# Patient Record
Sex: MALE | Race: WHITE | Employment: OTHER | URBAN - METROPOLITAN AREA
[De-identification: names, ages, dates, MRNs, and addresses within clinical notes are randomized per-mention and may not be internally consistent; named-entity substitution may affect disease eponyms.]

---

## 2019-04-22 ENCOUNTER — HOSPITAL ENCOUNTER (OUTPATIENT)
Dept: PHYSICAL THERAPY | Facility: CLINIC | Age: 39
Setting detail: THERAPIES SERIES
End: 2019-04-22
Attending: INTERNAL MEDICINE
Payer: MEDICAID

## 2019-04-22 ENCOUNTER — HOSPITAL ENCOUNTER (OUTPATIENT)
Dept: OCCUPATIONAL THERAPY | Facility: CLINIC | Age: 39
Setting detail: THERAPIES SERIES
End: 2019-04-22
Attending: INTERNAL MEDICINE
Payer: MEDICAID

## 2019-04-22 PROCEDURE — 97110 THERAPEUTIC EXERCISES: CPT | Mod: GP | Performed by: PHYSICAL THERAPIST

## 2019-04-22 PROCEDURE — 97162 PT EVAL MOD COMPLEX 30 MIN: CPT | Mod: GP | Performed by: PHYSICAL THERAPIST

## 2019-04-22 PROCEDURE — 97167 OT EVAL HIGH COMPLEX 60 MIN: CPT | Mod: GO | Performed by: REHABILITATION PRACTITIONER

## 2019-04-22 PROCEDURE — 97110 THERAPEUTIC EXERCISES: CPT | Mod: GO | Performed by: REHABILITATION PRACTITIONER

## 2019-04-22 ASSESSMENT — 6 MINUTE WALK TEST (6MWT)
COMMENTS: NO AD
TOTAL DISTANCE WALKED (FT): 1252

## 2019-04-22 NOTE — PROGRESS NOTES
[facility]        OUTPATIENT PHYSICAL THERAPY FUNCTIONAL EVALUATION  PLAN OF TREATMENT FOR OUTPATIENT REHABILITATION  (COMPLETE FOR INITIAL CLAIMS ONLY)  Patient's Last Name, First Name, M.I.  YOB: 1980  Srinivasa Casas        Provider's Name   [facility]   Medical Record No.  [id number]     Start of Care Date:  04/22/19   Onset Date:  03/13/19   Type:     _X__PT   ____OT  ____SLP Medical Diagnosis:  Hemiplegia and hemiparesis following cerebral infarction affecting right dominant side I69.351     PT Diagnosis:  Impaired functional activity tolerance secondary to right hemiparesis Visits from SOC:  1                              __________________________________________________________________________________  Plan of Treatment/Functional Goals:  balance training, gait training, neuromuscular re-education, ROM, strengthening, stretching, transfer training           GOALS  HEP  Patient will demonstrate understanding and compliance to his HEP for continued wellbeing upon discharge from skilled physical therapy.  06/03/19    6 MWT  Patient will ambulate 1480 feet during the 6 MWT with least restrictive AD to demonstrate improved activity tolerance for independent and safe community ambulation.  06/03/19    Balance  Patient will maintain single limb stance for 30 seconds bilaterally to demonstrate improved balance with decreased ANTONELLA for return to work without limitation.  06/03/19                                                           Therapy Frequency:  2 times/Week(Decreasing in frequency as indicated)   Predicted Duration of Therapy Intervention:  6 weeks    Rachael Mason DPT                                    I CERTIFY THE NEED FOR THESE SERVICES FURNISHED UNDER        THIS PLAN OF TREATMENT AND WHILE UNDER MY CARE     (Physician co-signature of this document  indicates review and certification of the therapy plan).                Certification Date From:  04/22/19   Certification Date To:  06/03/19    Referring Provider:  Lewis Farfan MD    Initial Assessment  See Epic Evaluation- Start of Care Date: 04/22/19

## 2019-04-22 NOTE — PROGRESS NOTES
04/22/19 0900   Quick Adds   Quick Adds Certification   Type of Visit Initial OP PT Evaluation   General Information   Start of Care Date 04/22/19   Referring Physician Lewis Farfan MD   Orders Evaluate and Treat as Indicated   Additional Orders Duration per therapist discretion   Order Date 04/11/19   Medical Diagnosis Hemiplegia and hemiparesis following cerebral infarction affecting right dominant side I69.351   Onset of illness/injury or Date of Surgery 03/13/19   Precautions/Limitations fall precautions   Surgical/Medical history reviewed Yes   Pertinent history of current problem (include personal factors and/or comorbidities that impact the POC) Per [facility] note: Patient was on a business trip in [place], [place], when he called his wife on 3/12/19 complaining of chest discomfort that improved with Zantac.  The following day around 2 PM, he was at a food court with his uncle when he suddenly collapsed.  He was noted to have right facial droop, aphasia, and right-sided weakness.  He was taken to the Emergency Department at [facility] and was found to have a STEMI and left MCA stroke.    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 He currently has right hemiparesis, global aphasia, and dysphagia. Patient's wife reports increased stiffness in the ankle, initially some drop foot but more stiffness. Patient's wife reports he is sitting more at home - possibly due to wound on the leg. Patient has been working on ankle pumps but resistant with his wife - reports he gets angry. Patient arguementative with his wife during session. Patient has not had any edema in the right foot, but it is stiff   Pertinent Visual History  Denies recent changes in vision   Prior level of function comment Patient previously independent with  all daily activities, functional mobility, and speech   Current Community Support Family/friend caregiver   Patient role/Employment history Employed  (Self employed/asphalt)   Living environment House/[facility]   Home/Community Accessibility Comments Lives with his wife in a single story house, 3 JAS with a railing   Current Assistive Devices   (None)   ADL Devices   (None)   Patient/Family Goals Statement Improve right sided strength   General Information Comments Patient has global aphasia   Fall Risk Screen   Fall screen completed by PT   Have you fallen 2 or more times in the past year? No   Have you fallen and had an injury in the past year? No   Timed Up and Go score (seconds) NTdoreen   Is patient a fall risk? No   Fall screen comments No history of falls, no increased fall risk per the Warren   Pain   Patient currently in pain Yes   Pain location Right lower leg and ankle   Pain description   (Stitches)   Vitals Signs   Heart Rate 78   SpO2 98   Blood Pressure 104/72   Vital Signs Comments Right arm, seated, resting   Cognitive Status Examination   Orientation other (see comments)  (Global aphasia, appears oriented but easily agitate)   Level of Consciousness alert;agitated   Follows Commands and Answers Questions able to follow single-step instructions;75% of the time   Personal Safety and Judgment impaired   Memory impaired  (Wife assists with history )   Integumentary   Integumentary Other   Integumentary Comments Currently has stitches in his leg from surgical procedure - incisions vertically along medial and lateral lower leg, getting them out at [facility] on 5/13/19   Posture   Posture Forward head position   Range of Motion (ROM)   ROM Quick Adds no deficits were identified   Strength   Strength Comments R LE grossly 4-/5   Bed Mobility   Bed Mobility Comments Independent   Transfer Skills   Transfer Comments Independent without UE assist   Gait   Gait Comments Patient ambulates with wide ANTONELLA,  decreased left step length with increased limping on the right leg due to incisional pain, decreased right stance time and increased hip abduction with onset of pain at 4.5 minutes of 6 MWT, decreased right arm swing   Gait Special Tests   Gait Special Tests SIX MINUTE WALK TEST   Gait Special Tests Six Minute Walk Test   Feet 1252 Feet   Comments No AD   Balance Special Tests   Balance Special Tests Single leg stance right;Single leg stance left;Sit to stand reps;Warren balance   Balance Special Tests Warren Balance   Score out of 56 56   Comments <46 indicates increased risk for falls   Balance Special Tests Single Leg Stance Right,   Right, seconds 24.1 Seconds   Comments Significant postural sway   Balance Special Tests Single Leg Stance Left   Left, seconds 12.94 Seconds   Balance Special Tests Sit to Stand Reps in 30 Seconds   Reps in 30 seconds 15   Height 18 inches   Sensory Examination   Sensory Perception no deficits were identified   Sensory Perception Comments Intact to light touch   Coordination   Coordination other (see comments)   Coordination Comments Impaired MANNY UE and LE   Muscle Tone   Muscle Tone no deficits were identified   Modality Interventions   Planned Modality Interventions Comments Per therapist discretion   Planned Therapy Interventions   Planned Therapy Interventions balance training;gait training;neuromuscular re-education;ROM;strengthening;stretching;transfer training   Clinical Impression   Criteria for Skilled Therapeutic Interventions Met yes, treatment indicated   PT Diagnosis Impaired functional activity tolerance secondary to right hemiparesis   Influenced by the following impairments Impaired coordination, strength, endurance, pain, surgical incisions   Functional limitations due to impairments Limited household or community ambulation, unable to work, increased pain and fatigue with prolonged walking   Clinical Presentation Stable/Uncomplicated   Clinical Presentation Rationale  Medically stable   Clinical Decision Making (Complexity) Moderate complexity   Therapy Frequency 2 times/Week  (Decreasing in frequency as indicated)   Predicted Duration of Therapy Intervention (days/wks) 6 weeks   Risk & Benefits of therapy have been explained Yes   Patient, Family & other staff in agreement with plan of care Yes   Clinical Impression Comments Patient is a 39 year old male presenting to physical therapy with right hemiparesis following a CVA. Patient presents with impaired coordination, endurance, and pain with prolonged activity. Patient has good rehab potential and may benefit from skilled physical therapy to address these deficits and facilitate return to work without limitation.   Education Assessment   Preferred Learning Style Demonstration   Barriers to Learning Language  (Global aphasia)   GOALS   PT Eval Goals 1;2;3   Goal 1   Goal Identifier HEP   Goal Description Patient will demonstrate understanding and compliance to his HEP for continued wellbeing upon discharge from skilled physical therapy.   Target Date 06/03/19   Goal 2   Goal Identifier 6 MWT   Goal Description Patient will ambulate 1480 feet during the 6 MWT with least restrictive AD to demonstrate improved activity tolerance for independent and safe community ambulation.   Target Date 06/03/19   Goal 3   Goal Identifier Balance   Goal Description Patient will maintain single limb stance for 30 seconds bilaterally to demonstrate improved balance with decreased ANTONELLA for return to work without limitation.   Target Date 06/03/19   Total Evaluation Time   PT Eval, Moderate Complexity Minutes (14172) 45   Therapy Certification   Certification date from 04/22/19   Certification date to 06/03/19   Medical Diagnosis Hemiplegia and hemiparesis following cerebral infarction affecting right dominant side I69.351   Certification I certify the need for these services furnished under this plan of treatment and while under my care.  (Physician  co-signature of this document indicates review and certification of the therapy plan).

## 2019-04-22 NOTE — PROGRESS NOTES
04/22/19 0900   Signing Clinician's Name / Credentials   Signing clinician's name / credentials Rachael Mason, DPT   Warren Balance Scale (MERNA FERGUSON, LOVE S, AUDRA COSBY, SRIKANTH PHAM: MEASURING BALANCE IN THE ELDERLY: VALIDATION OF AN INSTRUMENT. CAN. J. PUB. HEALTH, JULY/AUGUST SUPPLEMENT 2:S7-11, 1992.)   Sit To Stand 4   Standing Unsupported 4   Sitting Unsupported 4   Stand to Sit 4   Transfers 4   Standing with Eyes Closed 4   Standing Unsupported, Feet Together 4   Reach Forward With Outstretched Arm 4   Retrieve Object From Floor 4   Turning to Look Behind 4   Turn 360 Degrees 4   Placing Alternate Foot on Stool (4-6 inches) 4   Unsupported Tandem Stand (Demonstrate to Subject) 4   One Leg Stand 4   Total Score (A score of 45 or less has been correlated with an increased risk of falls)   Total Score (out of 56) 56   Warren Balance Scale (BBS) Cutoff Scores: A score of ? 45/56 indicates an increased risk for falls.   Patient Score: 56/56    The BBS is a measure of static and dynamic standing balance that has been validated in community dwelling elderly individuals and individuals who have Parkinson's Disease, MS, and those who are s/p CVA and TBI. The test is administered without an assistive device. Scores from the Warren are used to determine the probability of falling based on the patient's previous history of falls and their test performance.     Minimal Detectable Change = 6.5   & Minimal Detectable Change (Parkinson's Disease) = 5 according to Remberto & Garciaey 2008    Assessment (rationale for performing, application to patient s function & care plan): Assess risk for falls  Minutes billed as physical performance test: 0

## 2019-04-22 NOTE — PROGRESS NOTES
04/22/19 0800   Quick Adds   Quick Adds Certification   Type of Visit Initial Outpatient Occupational Therapy Evaluation   General Information   Start Of Care Date 04/22/19   Referring Physician Lewis Farfan MD   Orders Evaluate and treat as indicated   Other Orders PT, SLP   Orders Date 04/11/19   Medical Diagnosis Hemiplegia and hemiparesis following cerebral infarction affecting right dominant side I69.351   Onset of Illness/Injury or Date of Surgery 03/13/19   Precautions/Limitations Fall precautions   Special Instructions Duration per clinician discretion   Surgical/Medical History Reviewed Yes   Additional Occupational Profile Info/Pertinent History of Current Problem March 12 heart attack, march 13 stroke, at [facility], at [place] 4/5-4/17.  Pt is living at home with wife and children and receiving out-patient services.    Role/Living Environment   Current Community Support Family/friend caregiver   Patient role/Employment history Unemployed  ([employer] previously)   Community/Avocational Activities 6 children.  Job requires measuring, drawing and math tasks.  Pt is currently not working.  Wife does not work.   Current Living Environment House  ([place])   Number of Stairs to Enter Home 3   Number of Stairs Within Home 0   Prior Level - Transfers Independent   Prior Level - Ambulation Independent   Prior Level - ADLS Independent   Prior Responsibilities - IADL Shopping;Yardwork;Driving;Work   Role/Living Environment Comments No driving until cleared by neurology and OT   Patient/family Goals Statement Driving, math skills, improve strength, improve reflexes, return to work   Pain   Patient currently in pain No   Fall Risk Screen   Fall screen completed by OT   Have you fallen 2 or more times in the past year? No   Have you fallen and had an injury in the past year? No   Is patient a fall risk? No   Cognitive Status Examination   Level of Consciousness Alert   Follows Commands and Answers  Questions Able to follow single-step instructions  (Requires visual demonstration)   Personal Safety and Judgment Impaired;At risk behaviors demonstrated   Executive Function Impulsive;Self awareness/monitoring impaired   Cognitive Comment Pt demonstrates global aphasia.  Pt unable to consistently answer yes/no questions. Cognition not assessed d/t aphasia.   Visual Perception   Visual Perception No deficits were identified   Visual Perception Comments Pt unable to indentify deficits, will further assess   Sensation   Sensation Comments Pt denies any sensation changes.   Range of Motion (ROM)   ROM Comments Shoulder flexion/abduction, elbow flexion/extension, forearm supination/pronation, wrist extension/flexion all WFL's. Requires min verbal cues to achieve max ROM for R shoulder flexion and R forearm supination. Observed pronator drift.    Strength   Strength Comments L shoulder flexion/abduction, elbow flexion/extension, forearm supination/pronation all WNLs. R shoulder flexion/abduction, elbow flexion 4-/5. R elbow extension, forearm supination/pronation 5/5.   Hand Strength   Hand Dominance Right   Left Hand  (pounds) 115 pounds   Right Hand  (pounds) 95.6 pounds   Left Lateral Pinch (pounds) 25 pounds   Right Lateral Pinch (pounds) 22 pounds   Left Three Point Pinch (pounds) 21 pounds   Right Three Point Pinch (pounds) 18 pounds   Hand Strength Comments /pinch strength 1-2SD below the mean. Pt reports this weakness would affect his ability to complete work tasks.   Coordination   Coordination Comments Pt able to complete all [facility] tasks (buttoning, zippering, writing) with no difficulty. Coordination not further assessed.   Functional Mobility   Ambulation Pt walks with no devices for assistance, slight dragging of his R foot.   Bathing   Bathing Comments Pt independent in all ADLs, per wife.    Eating/Self-Feeding   Eating/Self Feeding Comments Pt's wife reports he can cut his food but she does it  to save time.   Activity Tolerance   Activity Tolerance Pt independent in all ADLs, per wife.  Anticipate pt is in need of assistance with all ADL tasks as a result of aphasia, impulsivity and inattention.   Instrumental Activities of Daily Living Assessment   IADL Assessment/Observations Pt's wife reports he is struggling with math tasks which are vital to his work. Pt currently not working, says he needs his strength and math skills closer to baseline to return to work. Pt's wife manages all finances.  Pt is getting assist for all driving and IADL tasks.  Wife reports difficulty with follow through with Home program.    Planned Therapy Interventions   Planned Therapy Interventions ADL training;IADL training;Cognitive skills;Community/work reintegration;Neuromuscular re-education;ROM;Self care/Home management;Strengthening;Stretching;Therapeutic activities   Adult OT Eval Goals   OT Eval Goals (Adult) 1;2;3;4;5;6    OT Goal 1   Goal Identifier 1-Strength   Goal Description Pt will demonstrate independence with UE strength HEP in order to return to baseline function for work tasks.   Target Date 07/15/19    OT Goal 2   Goal Identifier 2-community reintegration   Goal Description Pt will demonstrate mod I with visual scanning, attention, and problem-solving for increased safety with community reintegration.   Target Date 07/15/19    OT Goal 3   Goal Identifier 3-Medication management   Goal Description Pt will demonstrate ability to accurately set up 4 new moderately complex medications for increased safety and independence with medication management.   Target Date 07/15/19   OT Goal 4   Goal Identifier 4-Managing routine   Goal Description Pt will demonstrate mod I with following a routine (planning his day, getting ready, etc) 50% of the time for increased independence with ADL's/IADL's   Target Date 07/15/19   OT Goal 5   Goal Identifier 5-Work   Goal Description Pt will perform simulated work activity with 90%  accuracy and min cues for return to work tasks.   Target Date 07/15/19    OT Goal 6   Goal Identifier 6 Meal prep   Goal Description Pt will complete simple meal prep task safely with mod I.    Target Date 07/15/19   Clinical Impression   Criteria for Skilled Therapeutic Interventions Met Yes, treatment indicated   OT Diagnosis Decreased IADL/community mobility/work performance   Influenced by the following impairments Decreased strength, impulsivity, reduced attention, aphasia   Assessment of Occupational Performance 1-3 Performance Deficits   Identified Performance Deficits Decreased IADL performance, impaired community mobility, work   Clinical Decision Making (Complexity) High complexity   Therapy Frequency 3x/week for 8 weeks then 2x/week for 4 weeks   Predicted Duration of Therapy Intervention (days/wks) 12 weeks   Risks and Benefits of Treatment have been explained. Yes   Patient, Family & other staff in agreement with plan of care Yes   Clinical Impression Comments Pt will benefit from skilled OT to address strength, attention, and problem solving for IADL/community mobility/work tasks.  Assessment modified as a result of aphasia and impulsivity.    Education Assessment   Barriers To Learning Language   Preferred Learning Style Demonstration;Pictures/video   Therapy Certification   Certification date from 04/22/19   Certification date to 07/21/19   Total Evaluation Time   OT Eval, Moderate Complexity Minutes (51171) 32

## 2019-04-23 ENCOUNTER — HOSPITAL ENCOUNTER (OUTPATIENT)
Dept: SPEECH THERAPY | Facility: CLINIC | Age: 39
Setting detail: THERAPIES SERIES
End: 2019-04-23
Attending: INTERNAL MEDICINE
Payer: MEDICAID

## 2019-04-23 PROCEDURE — 92523 SPEECH SOUND LANG COMPREHEN: CPT | Mod: GN | Performed by: SPEECH-LANGUAGE PATHOLOGIST

## 2019-04-23 PROCEDURE — 92507 TX SP LANG VOICE COMM INDIV: CPT | Mod: GN | Performed by: SPEECH-LANGUAGE PATHOLOGIST

## 2019-04-23 NOTE — PROGRESS NOTES
Speech-Language Pathology Department   EVALUATION  [facility]    04/23/19 1000       Present No   General Information   Type of Evaluation Speech and Language;Dysarthria   Type Of Visit Initial   Start Of Care Date 04/23/19   Referring Physician Dr. Naheed MD    Orders Evaluate And Treat   Orders Comment Global aphasia, dysphagia, cognitive decline.    Medical Diagnosis CVA   Onset Of Illness/injury Or Date Of Surgery 03/13/19   Precautions/Limitations  fall precautions   Hearing WFL   Surgical/Medical history reviewed Yes   Pertinent History Of Current Problem Pt is a 39 y.o. male, who while on a business trip in [facility], called his wife on 3/12/19 and reported chest discomfort which was improved with Zantac. The following day around 2 P.M. Pt was at a food court with his uncle and suddenly collapsed. He was noted to have R facial droop, aphasia, and R sided weakness. Pt taken to ED at [facility] and was found to have STEMI and L MCA stroke. Pt suffered an acute stroke on 3/13/19 secondary to proximal L MCA occlusion with a complicated medical course, with R iliac arterial trombus s/p surgical thrombectomy and fasciotomy. He presents today with R hemiparesis, global aphasia, and resolved dysphagia.    Prior Level Of Function Comment Pt independent in all ADLs, working full time and managing business.    Current Community Support  Family/friend caregiver  (Wife)   Patient Role/employment History Employed  (Not currently working. )   Living environment [place]   General Observations Pt alert and engaged, impulsive, delayed processing, global aphasia.    Patient/family Goals Aid Pt in speaking clearly, return to work.    Fall Risk Screen   Fall screen comments Pt in open POC w/ PT, will defer.    Oral Motor Sensory Function   Comments Mild R facial droop, wife reports some food in R corner of mouth during meals. No  concerns with drooling/saliva management.    Speech   Apraxia Battery:  Sounds (out of 10 possible) 0   Apraxia Battery:  Word Repetition - single syllable (out of 10 possible) 0   Speech Comments No apraxic like movements identified, errors d/t lack of comprehension and expressive aphasia.    Language: Auditory Comprehension (understanding of spoken language)   Tests were administered at the following levels Basic (rote activities)   Word Discrimination; [place] Diagnostic Aphasia Exam (out of 72 total) 0   One Step Commands (out of 10 total) 0  (Able to follow direct model. )   Functional Assessment Scale (Auditory Comprehension) Severe Impairment   Language: Verbal Expression (use of spoken language to express information)   Tests were administered at the following levels Basic (rote activities);Moderate (routine daily activities);Complex (vocation/community/social activities)   Automatized Sequences; [place] Diagnostic Aphasia Exam (out of 8 total) 0   Phrase/Sentence Completion (out of 10 total) 0   [place] Naming Test, short form (out of 15 total) 1  (1/3 completed d/t Pt frustration. )   Functional Assessment Scale (Verbal Expression) Severe Impairment   Reading Comprehension (understanding of written language)   Tests were administered at the following levels Basic (rote activities);Moderate (routine daily activities)   Match Letters/Match Words (out of 8 total) 8   Simple Phrase/Sentence (out of 15 total) 5   Sentences and Paragraphs; [place] Diagnostic Aphasia Exam (out of 10 total) 2   Functional Assessment Scale (Reading Comprehension) Severe Impairment   Written Expression (use of writing to express information)   Tests were administered at the following levels Basic (rote activities)   Mechanics of Writing; [place] Diagnostic Aphasia Exam (out of 5 total) 3   Recall Written Symbols; [place] Diagnostic Aphasia Exam (out of 62 total) 23  (Best acuracy for numbers 1-21. Written cue for alphabet /a/.)    Functional Assessment Scale (Written Expression) Severe Impairment   Cognitive Status Examination   Behavioral Observations confused;impulsive;delayed processing   Executive Function Deficits Noted impulsivity;inhibition;insight/awareness;self-correction;self-monitoring   Cognitive Status Exam Comments Standardized testing not appropriate at this time d/t global aphasia. Impulsivity, and limited self monitoring/awarness/correction may be a barrier to progress.    Education Assessment   Barriers to Learning Cognitive;Emotional;Language   Preferred Learning Style Listening;Reading;Demonstration;Pictures/video   General Therapy Interventions   Planned Therapy Interventions Language   Language Auditory comprehension;Reading comprehension;Verbal expression;Written expression   Clinical Impression, SLP Eval   Criteria for Skilled Therapeutic Interventions Met (SLP Eval) skilled criteria for speech language intervention met   SLP Diagnosis Severe global aphasia   (Expressive/Receptive )   Influenced by the following factors/impairments Insight/awarness, fatigue, frustration, impulsivity.    Functional limitations due to impairments Pt is unable to communicate basic wants and needs. Pt is unable to follow basic directions for safety.    Rehab potential affected by Therapy attendance, Pt motivation, carry over with recommended home exercise program (HEP).    Therapy Frequency 5 times;per week   Predicted Duration of Therapy Intervention (days/wks) 4-6 months.    Risks and Benefits of Treatment have been explained. Yes   Patient, Family & other staff in agreement with plan of care Yes   Clinical Impression Comments Pt demonstrates severe global aphasia in all areas of language including; verbal expression, written expression, reading comprehension, and auditory comprehension. Symptoms include sever word finding difficulty and severe word/letter aphasias/paraphasias/nonsensical/words/jargon  with limited awareness of errors,  "difficulty word finding and spelling in written tasks, severely impaired comprehension of basic directions, and severely impaired understanding of basic to moderate written information. Language symptoms are impairing Pt's ability to communicate basic wants and needs, and pose a significant threat to his safety d/t impaired comprehension. RECOMMEND: a course of skilled speech therapy targeting all areas of expressive and receptive language to improve language skills for daily functional communication and RTW.    Language/Cognition Goals   Language/Cognition Goals 1;2;3;4   Language/Cognition Goal 1   Goal Identifier STG 9-Wznlstna-Lwbnuz Expression (V/E)   Goal Description Pt will complete a basic word finding task (picture naming, word assoications, ect) provided max cues and 80% accuracy in order to request desired items verbally.    Target Date 07/22/19   Language/Cognition Goal 2   Goal Identifier STG 3-Gytcclnr-Rrxscga Expression (W/E)   Goal Description Pt will complete a basic written expression task (name, address, ROCIO, JESSICA) provided max cues and 80% accuracy in order to sign his name independently.    Target Date 07/22/19   Language/Cognition Goal 3   Goal Identifier STG 3-Language-Auditory Comprehension (A/C)   Goal Description Pt will complete a basic auditory comprehension task (i.e. 1 step directions) provided max cues and 80% accuracy in order to understand directions for task completion.    Target Date 07/22/19   Language/Cognition Goal 4   Goal Identifier STG 1-Xpoophwy-Zaquohu Comprehension (R/C)   Goal Description Pt will complete a basic level R/C task (i.e. read a direction) provided mod cues and 80% accuracy in order to follow written directions/steps to a task independently.    Target Date 07/22/19   Total Session Time   Sound production (artic, phonology, apraxia, dysarthria) Minutes (08298) 45   Total Evaluation Time 45  (+10 min tx. )     Thank you for referring Srinivasa \"Geo\" Yessenia to " outpatient therapy at [facility] in [place].  Please call Felicitas Coffey MA, SLP-CCC at [phone] or email [email or web address] with any questions or concerns.      Felicitas Coffey M.A., SLP-CCC  Speech-Language Pathologist

## 2019-04-23 NOTE — PROGRESS NOTES
[facility]          OUTPATIENT SPEECH LANGUAGE PATHOLOGY LANGUAGE-COGNITION  EVALUATION  PLAN OF TREATMENT FOR OUTPATIENT REHABILITATION  (COMPLETE FOR INITIAL CLAIMS ONLY)  Patient's Last Name, First Name, M.I.  YOB: 1980  Srinivasa Casas                           Provider s Name: [facility] Medical Record No.  [id number]     Onset Date:  03/13/19   Start of Care Date: 04/23/19   Type:     ___PT  __OT   _X_SLP    Medical Diagnosis:   CVA    Speech Language Pathology Diagnosis:   Severe Global Aphasia     Visits from SOC: 1                                        ________________________________________________________________________________  Plan of Treatment/Functional Goals:   Planned Therapy Interventions: Language          Language / Cognition Goals  1. Goal Identifier: STG 7-Vjljixjb-Ciojur Expression (V/E)       Goal Description: Pt will complete a basic word finding task (picture naming, word assoications, ect) provided max cues and 80% accuracy in order to request desired items verbally.        Target Date: 07/22/19   2. Goal Identifier: STG 0-Kvsomdwj-Gseealh Expression (W/E)       Goal Description: Pt will complete a basic written expression task (name, address, ROCIO, JESSICA) provided max cues and 80% accuracy in order to sign his name independently.        Target Date: 07/22/19   3. Goal Identifier: STG 3-Language-Auditory Comprehension (A/C)       Goal Description: Pt will complete a basic auditory comprehension task (i.e. 1 step directions) provided max cues and 80% accuracy in order to understand directions for task completion.        Target Date: 07/22/19   4. Goal Identifier: STG 1-Akxajkgo-Dnvotpc Comprehension (R/C)       Goal Description: Pt will complete a basic level R/C task (i.e. read a direction) provided mod cues and 80% accuracy in order to follow written  directions/steps to a task independently.        Target Date: 07/22/19           Predicted Duration of Therapy Intervention (days/wks): 5 days/week for 4-6 months.     Felicitas Coffey MA CCC-SLP       I CERTIFY THE NEED FOR THESE SERVICES FURNISHED UNDER        THIS PLAN OF TREATMENT AND WHILE UNDER MY CARE .             Physician Signature               Date    X_____________________________________________________                        Certification Date From:   4/23/2019  Certification Date To:    7/21/2019        Referring Physician:  Dr. Naheed MD     Initial Assessment        See Epic Evaluation Start Of Care Date: 04/23/19

## 2019-04-24 ENCOUNTER — HOSPITAL ENCOUNTER (OUTPATIENT)
Dept: SPEECH THERAPY | Facility: CLINIC | Age: 39
Setting detail: THERAPIES SERIES
End: 2019-04-24
Attending: INTERNAL MEDICINE
Payer: MEDICAID

## 2019-04-24 PROCEDURE — 92507 TX SP LANG VOICE COMM INDIV: CPT | Mod: GN | Performed by: SPEECH-LANGUAGE PATHOLOGIST

## 2019-04-25 ENCOUNTER — HOSPITAL ENCOUNTER (OUTPATIENT)
Dept: SPEECH THERAPY | Facility: CLINIC | Age: 39
Setting detail: THERAPIES SERIES
End: 2019-04-25
Attending: INTERNAL MEDICINE
Payer: MEDICAID

## 2019-04-25 ENCOUNTER — HOSPITAL ENCOUNTER (OUTPATIENT)
Dept: OCCUPATIONAL THERAPY | Facility: CLINIC | Age: 39
Setting detail: THERAPIES SERIES
End: 2019-04-25
Attending: INTERNAL MEDICINE
Payer: MEDICAID

## 2019-04-25 PROCEDURE — 97110 THERAPEUTIC EXERCISES: CPT | Mod: GO,XU | Performed by: REHABILITATION PRACTITIONER

## 2019-04-25 PROCEDURE — 97530 THERAPEUTIC ACTIVITIES: CPT | Mod: GO,XU | Performed by: REHABILITATION PRACTITIONER

## 2019-04-25 PROCEDURE — 92507 TX SP LANG VOICE COMM INDIV: CPT | Mod: GN | Performed by: SPEECH-LANGUAGE PATHOLOGIST

## 2019-04-25 NOTE — PROGRESS NOTES
[facility]          OUTPATIENT OCCUPATIONAL THERAPY  EVALUATION  PLAN OF TREATMENT FOR OUTPATIENT REHABILITATION  (COMPLETE FOR INITIAL CLAIMS ONLY)  Patient's Last Name, First Name, M.I.  YOB: 1980  Srinivasa Casas                           Provider's Name  [facility] Medical Record No.  [id number]                               Onset Date:   3/13/19      Start of Care Date:      4/22/19   Type:     ___PT   _X_OT   ___SLP Medical Diagnosis:   CVI with hemiplegia and hemiparesis                             OT Diagnosis:   Impaired ADL/IADL     Visits from SOC:  1   _________________________________________________________________________________  Plan of Treatment/Functional Goals:         Goals  Goal Identifier: 1-Strength  Goal Description: Pt will demonstrate independence with UE strength HEP in order to return to baseline function for work tasks.  Target Date: 07/15/19     Goal Identifier: 2-community reintegration  Goal Description: Pt will demonstrate mod I with visual scanning, attention, and problem-solving for increased safety with community reintegration.  Target Date: 07/15/19     Goal Identifier: 3-Medication management  Goal Description: Pt will demonstrate ability to accurately set up 4 new moderately complex medications for increased safety and independence with medication management.  Target Date: 07/15/19     Goal Identifier: 4-Managing routine  Goal Description: Pt will demonstrate mod I with following a routine (planning his day, getting ready, etc) 50% of the time for increased independence with ADL's/IADL's  Target Date: 07/15/19     Goal Identifier: 5-Work  Goal Description: Pt will perform simulated work activity with 90% accuracy and min cues for return to work tasks.  Target Date: 07/15/19     Goal Identifier: 6 Meal prep  Goal Description: Pt will  complete simple meal prep task safely with mod I.   Target Date: 07/15/19            Jeannette Jones OT          I CERTIFY THE NEED FOR THESE SERVICES FURNISHED UNDER        THIS PLAN OF TREATMENT AND WHILE UNDER MY CARE .             Physician Signature               Date    X_____________________________________________________      Dr. Farfan Carson [facility]     Initial Assessment        See Epic Evaluation

## 2019-04-26 ENCOUNTER — HOSPITAL ENCOUNTER (OUTPATIENT)
Dept: SPEECH THERAPY | Facility: CLINIC | Age: 39
Setting detail: THERAPIES SERIES
End: 2019-04-26
Attending: INTERNAL MEDICINE
Payer: MEDICAID

## 2019-04-26 PROCEDURE — 92507 TX SP LANG VOICE COMM INDIV: CPT | Mod: GN | Performed by: SPEECH-LANGUAGE PATHOLOGIST

## 2019-04-29 ENCOUNTER — HOSPITAL ENCOUNTER (OUTPATIENT)
Dept: PHYSICAL THERAPY | Facility: CLINIC | Age: 39
Setting detail: THERAPIES SERIES
End: 2019-04-29
Attending: INTERNAL MEDICINE
Payer: MEDICAID

## 2019-04-29 PROCEDURE — 97110 THERAPEUTIC EXERCISES: CPT | Mod: GP | Performed by: PHYSICAL THERAPIST

## 2019-04-29 PROCEDURE — 97112 NEUROMUSCULAR REEDUCATION: CPT | Mod: GP | Performed by: PHYSICAL THERAPIST

## 2019-04-30 ENCOUNTER — HOSPITAL ENCOUNTER (OUTPATIENT)
Dept: OCCUPATIONAL THERAPY | Facility: CLINIC | Age: 39
Setting detail: THERAPIES SERIES
End: 2019-04-30
Attending: INTERNAL MEDICINE
Payer: MEDICAID

## 2019-04-30 ENCOUNTER — HOSPITAL ENCOUNTER (OUTPATIENT)
Dept: SPEECH THERAPY | Facility: CLINIC | Age: 39
Setting detail: THERAPIES SERIES
End: 2019-04-30
Attending: INTERNAL MEDICINE
Payer: MEDICAID

## 2019-04-30 PROCEDURE — 92507 TX SP LANG VOICE COMM INDIV: CPT | Mod: GN | Performed by: SPEECH-LANGUAGE PATHOLOGIST

## 2019-04-30 PROCEDURE — 97537 COMMUNITY/WORK REINTEGRATION: CPT | Mod: GO | Performed by: OCCUPATIONAL THERAPIST

## 2019-05-01 ENCOUNTER — HOSPITAL ENCOUNTER (OUTPATIENT)
Dept: SPEECH THERAPY | Facility: CLINIC | Age: 39
Setting detail: THERAPIES SERIES
End: 2019-05-01
Attending: INTERNAL MEDICINE
Payer: COMMERCIAL

## 2019-05-01 ENCOUNTER — HOSPITAL ENCOUNTER (OUTPATIENT)
Dept: OCCUPATIONAL THERAPY | Facility: CLINIC | Age: 39
Setting detail: THERAPIES SERIES
End: 2019-05-01
Attending: INTERNAL MEDICINE
Payer: COMMERCIAL

## 2019-05-01 ENCOUNTER — HOSPITAL ENCOUNTER (OUTPATIENT)
Dept: PHYSICAL THERAPY | Facility: CLINIC | Age: 39
Setting detail: THERAPIES SERIES
End: 2019-05-01
Attending: INTERNAL MEDICINE
Payer: COMMERCIAL

## 2019-05-01 PROCEDURE — 92507 TX SP LANG VOICE COMM INDIV: CPT | Mod: GN | Performed by: SPEECH-LANGUAGE PATHOLOGIST

## 2019-05-01 PROCEDURE — 97110 THERAPEUTIC EXERCISES: CPT | Mod: GP | Performed by: PHYSICAL THERAPIST

## 2019-05-01 PROCEDURE — 97535 SELF CARE MNGMENT TRAINING: CPT | Mod: GO | Performed by: OCCUPATIONAL THERAPIST

## 2019-05-01 PROCEDURE — 97537 COMMUNITY/WORK REINTEGRATION: CPT | Mod: GO | Performed by: OCCUPATIONAL THERAPIST

## 2019-05-01 PROCEDURE — 97112 NEUROMUSCULAR REEDUCATION: CPT | Mod: GP | Performed by: PHYSICAL THERAPIST

## 2019-05-03 ENCOUNTER — HOSPITAL ENCOUNTER (OUTPATIENT)
Dept: SPEECH THERAPY | Facility: CLINIC | Age: 39
Setting detail: THERAPIES SERIES
End: 2019-05-03
Attending: INTERNAL MEDICINE
Payer: COMMERCIAL

## 2019-05-03 ENCOUNTER — HOSPITAL ENCOUNTER (OUTPATIENT)
Dept: OCCUPATIONAL THERAPY | Facility: CLINIC | Age: 39
Setting detail: THERAPIES SERIES
End: 2019-05-03
Attending: INTERNAL MEDICINE
Payer: COMMERCIAL

## 2019-05-03 PROCEDURE — 97530 THERAPEUTIC ACTIVITIES: CPT | Mod: GO | Performed by: OCCUPATIONAL THERAPIST

## 2019-05-03 PROCEDURE — 97535 SELF CARE MNGMENT TRAINING: CPT | Mod: GO | Performed by: OCCUPATIONAL THERAPIST

## 2019-05-03 PROCEDURE — 92507 TX SP LANG VOICE COMM INDIV: CPT | Mod: GN | Performed by: SPEECH-LANGUAGE PATHOLOGIST

## 2019-05-03 PROCEDURE — 97537 COMMUNITY/WORK REINTEGRATION: CPT | Mod: GO | Performed by: OCCUPATIONAL THERAPIST

## 2019-05-06 ENCOUNTER — HOSPITAL ENCOUNTER (OUTPATIENT)
Dept: OCCUPATIONAL THERAPY | Facility: CLINIC | Age: 39
Setting detail: THERAPIES SERIES
End: 2019-05-06
Attending: INTERNAL MEDICINE
Payer: COMMERCIAL

## 2019-05-06 PROCEDURE — 97537 COMMUNITY/WORK REINTEGRATION: CPT | Mod: GO | Performed by: REHABILITATION PRACTITIONER

## 2019-05-06 PROCEDURE — 97535 SELF CARE MNGMENT TRAINING: CPT | Mod: GO | Performed by: REHABILITATION PRACTITIONER

## 2019-05-07 ENCOUNTER — HOSPITAL ENCOUNTER (OUTPATIENT)
Dept: OCCUPATIONAL THERAPY | Facility: CLINIC | Age: 39
Setting detail: THERAPIES SERIES
End: 2019-05-07
Attending: INTERNAL MEDICINE
Payer: COMMERCIAL

## 2019-05-07 ENCOUNTER — HOSPITAL ENCOUNTER (OUTPATIENT)
Dept: SPEECH THERAPY | Facility: CLINIC | Age: 39
Setting detail: THERAPIES SERIES
End: 2019-05-07
Attending: INTERNAL MEDICINE
Payer: COMMERCIAL

## 2019-05-07 PROCEDURE — 92507 TX SP LANG VOICE COMM INDIV: CPT | Mod: GN | Performed by: SPEECH-LANGUAGE PATHOLOGIST

## 2019-05-07 PROCEDURE — 97537 COMMUNITY/WORK REINTEGRATION: CPT | Mod: GO | Performed by: OCCUPATIONAL THERAPIST

## 2019-05-08 ENCOUNTER — HOSPITAL ENCOUNTER (OUTPATIENT)
Dept: SPEECH THERAPY | Facility: CLINIC | Age: 39
Setting detail: THERAPIES SERIES
End: 2019-05-08
Attending: INTERNAL MEDICINE
Payer: COMMERCIAL

## 2019-05-08 PROCEDURE — 92507 TX SP LANG VOICE COMM INDIV: CPT | Mod: GN | Performed by: SPEECH-LANGUAGE PATHOLOGIST

## 2019-05-09 ENCOUNTER — HOSPITAL ENCOUNTER (OUTPATIENT)
Dept: PHYSICAL THERAPY | Facility: CLINIC | Age: 39
Setting detail: THERAPIES SERIES
End: 2019-05-09
Attending: INTERNAL MEDICINE
Payer: COMMERCIAL

## 2019-05-09 ENCOUNTER — HOSPITAL ENCOUNTER (OUTPATIENT)
Dept: SPEECH THERAPY | Facility: CLINIC | Age: 39
Setting detail: THERAPIES SERIES
End: 2019-05-09
Attending: INTERNAL MEDICINE
Payer: COMMERCIAL

## 2019-05-09 PROCEDURE — 97110 THERAPEUTIC EXERCISES: CPT | Mod: GP | Performed by: PHYSICAL THERAPIST

## 2019-05-09 PROCEDURE — 97116 GAIT TRAINING THERAPY: CPT | Mod: GP | Performed by: PHYSICAL THERAPIST

## 2019-05-09 PROCEDURE — 97112 NEUROMUSCULAR REEDUCATION: CPT | Mod: GP | Performed by: PHYSICAL THERAPIST

## 2019-05-09 PROCEDURE — 92507 TX SP LANG VOICE COMM INDIV: CPT | Mod: GN | Performed by: SPEECH-LANGUAGE PATHOLOGIST

## 2019-05-10 ENCOUNTER — HOSPITAL ENCOUNTER (OUTPATIENT)
Dept: SPEECH THERAPY | Facility: CLINIC | Age: 39
Setting detail: THERAPIES SERIES
End: 2019-05-10
Attending: INTERNAL MEDICINE
Payer: COMMERCIAL

## 2019-05-10 ENCOUNTER — HOSPITAL ENCOUNTER (OUTPATIENT)
Dept: OCCUPATIONAL THERAPY | Facility: CLINIC | Age: 39
Setting detail: THERAPIES SERIES
End: 2019-05-10
Attending: INTERNAL MEDICINE
Payer: COMMERCIAL

## 2019-05-10 PROCEDURE — 97537 COMMUNITY/WORK REINTEGRATION: CPT | Mod: GO | Performed by: OCCUPATIONAL THERAPIST

## 2019-05-10 PROCEDURE — 97535 SELF CARE MNGMENT TRAINING: CPT | Mod: GO | Performed by: OCCUPATIONAL THERAPIST

## 2019-05-10 PROCEDURE — 92507 TX SP LANG VOICE COMM INDIV: CPT | Mod: GN | Performed by: SPEECH-LANGUAGE PATHOLOGIST

## 2019-05-13 ENCOUNTER — TELEPHONE (OUTPATIENT)
Dept: OTHER | Facility: CLINIC | Age: 39
End: 2019-05-13

## 2019-05-14 ENCOUNTER — HOSPITAL ENCOUNTER (OUTPATIENT)
Dept: OCCUPATIONAL THERAPY | Facility: CLINIC | Age: 39
Setting detail: THERAPIES SERIES
End: 2019-05-14
Attending: INTERNAL MEDICINE
Payer: COMMERCIAL

## 2019-05-14 ENCOUNTER — HOSPITAL ENCOUNTER (OUTPATIENT)
Dept: SPEECH THERAPY | Facility: CLINIC | Age: 39
Setting detail: THERAPIES SERIES
End: 2019-05-14
Attending: INTERNAL MEDICINE
Payer: COMMERCIAL

## 2019-05-14 PROCEDURE — 97535 SELF CARE MNGMENT TRAINING: CPT | Mod: GO | Performed by: OCCUPATIONAL THERAPIST

## 2019-05-14 PROCEDURE — 92507 TX SP LANG VOICE COMM INDIV: CPT | Mod: GN | Performed by: SPEECH-LANGUAGE PATHOLOGIST

## 2019-05-15 ENCOUNTER — HOSPITAL ENCOUNTER (OUTPATIENT)
Dept: SPEECH THERAPY | Facility: CLINIC | Age: 39
Setting detail: THERAPIES SERIES
End: 2019-05-15
Attending: INTERNAL MEDICINE
Payer: COMMERCIAL

## 2019-05-15 PROCEDURE — 92507 TX SP LANG VOICE COMM INDIV: CPT | Mod: GN | Performed by: SPEECH-LANGUAGE PATHOLOGIST

## 2019-05-16 ENCOUNTER — HOSPITAL ENCOUNTER (OUTPATIENT)
Dept: SPEECH THERAPY | Facility: CLINIC | Age: 39
Setting detail: THERAPIES SERIES
End: 2019-05-16
Attending: INTERNAL MEDICINE
Payer: COMMERCIAL

## 2019-05-16 PROCEDURE — 92507 TX SP LANG VOICE COMM INDIV: CPT | Mod: GN | Performed by: SPEECH-LANGUAGE PATHOLOGIST

## 2019-05-16 NOTE — TELEPHONE ENCOUNTER
"Pts wife Patience called, left vm.     I called back, left vm noting needing further info re thrombectomy, med history and any recent imaging or referral/self referral. Awaiting call back.     Upon further review, notes now available in Epic. Pts 5/13/19 OV notes, Dr. Celia Humphries noted  \"right iliac arterial thrombus status post thrombectomy and fasciotomy\",   \"He developed right lower extremity ischemia felt to be secondary to complication of cardiac catheterization. CTA revealed a complete occlusion of the right external iliac artery. Attempted mechanical thrombectomy was unsuccessfull and he therefore completed surgical thrombectomy and fasciotomy March 21, 2019. He was initially on heparin but due to concerns for possible heparin resistance changed to argatroban transitioned to warfarin.\"    This intervention was done in [place], pt needs to establish with vascular surgeon here in [place].     Pt needs consult with vascular surgery as soon as possible.       Bambi Downing, DOROTHEAN, RN    "

## 2019-05-17 ENCOUNTER — HOSPITAL ENCOUNTER (OUTPATIENT)
Dept: SPEECH THERAPY | Facility: CLINIC | Age: 39
Setting detail: THERAPIES SERIES
End: 2019-05-17
Attending: INTERNAL MEDICINE
Payer: COMMERCIAL

## 2019-05-17 PROCEDURE — 92507 TX SP LANG VOICE COMM INDIV: CPT | Mod: GN | Performed by: SPEECH-LANGUAGE PATHOLOGIST

## 2019-05-20 ENCOUNTER — HOSPITAL ENCOUNTER (OUTPATIENT)
Dept: OCCUPATIONAL THERAPY | Facility: CLINIC | Age: 39
Setting detail: THERAPIES SERIES
End: 2019-05-20
Attending: INTERNAL MEDICINE
Payer: COMMERCIAL

## 2019-05-20 PROCEDURE — 97537 COMMUNITY/WORK REINTEGRATION: CPT | Mod: GO | Performed by: REHABILITATION PRACTITIONER

## 2019-05-21 ENCOUNTER — HOSPITAL ENCOUNTER (OUTPATIENT)
Dept: SPEECH THERAPY | Facility: CLINIC | Age: 39
Setting detail: THERAPIES SERIES
End: 2019-05-21
Attending: INTERNAL MEDICINE
Payer: COMMERCIAL

## 2019-05-21 ENCOUNTER — OFFICE VISIT (OUTPATIENT)
Dept: OTHER | Facility: CLINIC | Age: 39
End: 2019-05-21
Attending: SURGERY
Payer: COMMERCIAL

## 2019-05-21 VITALS — HEART RATE: 56 BPM | SYSTOLIC BLOOD PRESSURE: 109 MMHG | DIASTOLIC BLOOD PRESSURE: 72 MMHG

## 2019-05-21 DIAGNOSIS — I73.9 CLAUDICATION (H): Primary | ICD-10-CM

## 2019-05-21 DIAGNOSIS — Z98.890 HISTORY OF FASCIOTOMY: Primary | ICD-10-CM

## 2019-05-21 DIAGNOSIS — E78.5 HYPERLIPIDEMIA LDL GOAL <70: ICD-10-CM

## 2019-05-21 DIAGNOSIS — I99.8 LOWER LIMB ISCHEMIA: ICD-10-CM

## 2019-05-21 PROCEDURE — G0463 HOSPITAL OUTPT CLINIC VISIT: HCPCS

## 2019-05-21 PROCEDURE — 99204 OFFICE O/P NEW MOD 45 MIN: CPT | Mod: ZP | Performed by: SURGERY

## 2019-05-21 PROCEDURE — 92507 TX SP LANG VOICE COMM INDIV: CPT | Mod: GN | Performed by: SPEECH-LANGUAGE PATHOLOGIST

## 2019-05-21 RX ORDER — WARFARIN SODIUM 1 MG/1
TABLET ORAL
COMMUNITY
Start: 2019-04-17

## 2019-05-21 RX ORDER — ASPIRIN 81 MG/1
81 TABLET, CHEWABLE ORAL
COMMUNITY
Start: 2019-05-08

## 2019-05-21 RX ORDER — LISINOPRIL 2.5 MG/1
2.5 TABLET ORAL
COMMUNITY
Start: 2019-04-17

## 2019-05-21 RX ORDER — CARVEDILOL 3.12 MG/1
3.12 TABLET ORAL
COMMUNITY
Start: 2019-04-17

## 2019-05-21 RX ORDER — ATORVASTATIN CALCIUM 80 MG/1
80 TABLET, FILM COATED ORAL
COMMUNITY
Start: 2019-04-17

## 2019-05-21 RX ORDER — GABAPENTIN 100 MG/1
CAPSULE ORAL
COMMUNITY
Start: 2019-04-26 | End: 2020-08-28

## 2019-05-21 NOTE — LETTER
[facility] [address]  [place] [address]  Phone: [phone]  Fax: [phone]      May 21, 2019       Re: Srinivasa Casas - 1980    [facility]      PRIMARY CARE PROVIDER: Parish Thomas     Reason for visit:  Right lower leg recent surgery     IMPRESSION:  38 yo male with recent MI, stroke and right leg ischemia s/p embolectomy and 4 compartment fasciotomy.     RECOMMENDATION:  I've discussed with the patient and his wife that we should proceed with ultrasound of the right leg arteries and NATALEE's with exercise if he can tolerate it.  He is on coumadin and the stroke was reported to be cardioembolic at the time of the heart attack.  The patient and his wife are in agreement with the plan.  I will call them with results of the tests once they are complete.  Then I plan on seeing him back in 3 months to see how he is progressing post-op.  I also discussed that he may need long term compression therapy to help with his right leg swelling until it improves.  It appears his risk factors are being modified and I would recommend continuation of these modifications.      HPI:  Srinivasa Casas is a 39 year old male who was seen today to establish care with vascular surgery.  He was in [place] in March when he had a heart attack and reported stroke at the same time with expressive aphasia still today.  His wife says his stroke symptoms are slowly improving.  He also had thrombosis of the right EIA after cardiac cath, requiring right femoral embolectomy to restore flow to the right leg post-operatively and 4 compartment fasciotomy of the right lower leg.  He has done well since that time.  There is maybe some mild numbness in the distal foot, but this is minimal.  He also had mild pain but since suture was removed and starting gabapentin this pain has resolved.  Most of the interview his wife is speaking for him because of the  aphasia and he doesn't speak great english.  He was seen at [facility] then for some follow up and was told there is no signal in the DP artery on the right  Until the 1st web space of the right foot.  This is unchanged today.  His wife reports that he has a very strong family history of heart attack, his dad had a heart attack at age 43.      PHH:    Hypertension  Stroke  MI  Hyperlipidemia  Right lower leg 4 compartment fasciotomy and recent iliac/femoral thromboembolectomy          EXAM:  GENERAL: This is a well-developed 39 year old male who appears his stated age  EYES: Grossly normal.  MOUTH: Buccal mucosa normal   CARDIAC:  NS1 S2, No Murmur  CHEST/LUNG:  Clear lung fields bilaterally   GASTROINTESINAL (ABDOMEN): Soft, non-tender, B/S present, no pulsatile mass  MUSCULOSKELETAL: Grossly normal and both lower extremities are intact.  HEME/LYMPH: No lymphedema  NEUROLOGIC: Focally intact, Alert and oriented x 3.   PSYCH: appropriate, affect  INTEGUMENT: No open lesions or ulcers.  The right lower leg fasciotomy sites are closed. There are 2 small areas where the skin edges had probably  in the right lateral inferior incision and the middle of the medial incision.  No signs of infection.  He does have edema in the right lower leg compared to the left, but this is mild/moderate.   Pulse Exam:      Radial: Left 2              Right  2     Femoral: Left 2              Right  2     DP:      Left 2              Right  0     PT:       Left 2              Right  0        Right PT monophasic signal with doppler, no DP signal as reported.            Bethel Joe MD  VASCULAR SURGERY

## 2019-05-21 NOTE — PROGRESS NOTES
VASC[facility]GERY CLINIC CONSULTATION    VASCULAR SURGEON: Bethel Joe MD    LOCATION:  [facility]    Srinivasa Casas   Medical Record #:  [id number]  YOB: 1980  Age:  39 year old     Date of Service: 5/21/2019    PRIMARY CARE PROVIDER: Parish Thomas      Reason for visit:  Right lower leg recent surgery    IMPRESSION:  38 yo male with recent MI, stroke and right leg ischemia s/p embolectomy and 4 compartment fasciotomy.    RECOMMENDATION:  I've discussed with the patient and his wife that we should proceed with ultrasound of the right leg arteries and NATALEE's with exercise if he can tolerate it.  He is on coumadin and the stroke was reported to be cardioembolic at the time of the heart attack.  The patient and his wife are in agreement with the plan.  I will call them with results of the tests once they are complete.  Then I plan on seeing him back in 3 months to see how he is progressing post-op.  I also discussed that he may need long term compression therapy to help with his right leg swelling until it improves.  It appears his risk factors are being modified and I would recommend continuation of these modifications.     HPI:  Srinivasa Casas is a 39 year old male who was seen today to establish care with vascular surgery.  He was in [place] in March when he had a heart attack and reported stroke at the same time with expressive aphasia still today.  His wife says his stroke symptoms are slowly improving.  He also had thrombosis of the right EIA after cardiac cath, requiring right femoral embolectomy to restore flow to the right leg post-operatively and 4 compartment fasciotomy of the right lower leg.  He has done well since that time.  There is maybe some mild numbness in the distal foot, but this is minimal.  He also had mild pain but since suture was removed and starting gabapentin this pain has resolved.  Most of the interview his wife is speaking for  him because of the aphasia and he doesn't speak great english.  He was seen at [facility] then for some follow up and was told there is no signal in the DP artery on the right  Until the 1st web space of the right foot.  This is unchanged today.  His wife reports that he has a very strong family history of heart attack, his dad had a heart attack at age 43.     PHH:    Hypertension  Stroke  MI  Hyperlipidemia  Right lower leg 4 compartment fasciotomy and recent iliac/femoral thromboembolectomy    ALLERGIES:  Patient has no allergy information on record.    MEDS:    Current Outpatient Medications:      aspirin (ASA) 81 MG chewable tablet, Take 81 mg by mouth, Disp: , Rfl:      atorvastatin (LIPITOR) 80 MG tablet, Take 80 mg by mouth, Disp: , Rfl:      carvedilol (COREG) 3.125 MG tablet, Take 3.125 mg by mouth, Disp: , Rfl:      gabapentin (NEURONTIN) 100 MG capsule, , Disp: , Rfl:      lisinopril (PRINIVIL/ZESTRIL) 2.5 MG tablet, Take 2.5 mg by mouth, Disp: , Rfl:      sertraline (ZOLOFT) 50 MG tablet, Take 50 mg by mouth, Disp: , Rfl:      warfarin (COUMADIN) 1 MG tablet, Take by mouth 4 mg (1 mg x 4) every Wed; 3 mg (1 mg x 3) all other days, Disp: , Rfl:     SOCIAL HABITS:    History   Smoking Status     Not on file   Smokeless Tobacco     Not on file     Social History    Substance and Sexual Activity      Alcohol use: Not on file      History   Drug Use Not on file       FAMILY HISTORY:  No family history on file.    REVIEW OF SYSTEMS:    A 12 point ROS was reviewed and except for what is listed in the HPI above, all others are negative    PE:  /72 (BP Location: Left arm, Patient Position: Chair, Cuff Size: Adult Regular)   Pulse 56   Wt Readings from Last 1 Encounters:   No data found for Wt     There is no height or weight on file to calculate BMI.    EXAM:  GENERAL: This is a well-developed 39 year old male who appears his stated age  EYES: Grossly normal.  MOUTH: Buccal mucosa normal   CARDIAC:  NS1  S2, No Murmur  CHEST/LUNG:  Clear lung fields bilaterally   GASTROINTESINAL (ABDOMEN): Soft, non-tender, B/S present, no pulsatile mass  MUSCULOSKELETAL: Grossly normal and both lower extremities are intact.  HEME/LYMPH: No lymphedema  NEUROLOGIC: Focally intact, Alert and oriented x 3.   PSYCH: appropriate, affect  INTEGUMENT: No open lesions or ulcers.  The right lower leg fasciotomy sites are closed.  There are 2 small areas where the skin edges had probably  in the right lateral inferior incision and the middle of the medial incision.  No signs of infection.  He does have edema in the right lower leg compared to the left, but this is mild/moderate.   Pulse Exam:     Radial: Left 2   Right  2    Femoral: Left 2   Right  2    DP: Left 2   Right  0     PT:   Left 2   Right  0      Right PT monophasic signal with doppler, no DP signal as reported.         Bethel Joe MD  VASCULAR SURGERY

## 2019-05-21 NOTE — NURSING NOTE
Srinivasa Casas is a 39 year old male who presents for:  Chief Complaint   Patient presents with     RECHECK     New consult to establish care after fasciotomies & thrombectomies in [place]. Med recs in CareEverywhere.         Vitals:    Vitals:    05/21/19 1209 05/21/19 1210   BP: 113/76 109/72   BP Location: Right arm Left arm   Patient Position: Chair Chair   Cuff Size: Adult Regular Adult Regular   Pulse: 57 56       BMI:  There is no height or weight on file to calculate BMI.    Pain Score:  Data Unavailable        Taryn Pittman MA

## 2019-05-23 ENCOUNTER — HOSPITAL ENCOUNTER (OUTPATIENT)
Dept: SPEECH THERAPY | Facility: CLINIC | Age: 39
Setting detail: THERAPIES SERIES
End: 2019-05-23
Attending: INTERNAL MEDICINE
Payer: COMMERCIAL

## 2019-05-23 ENCOUNTER — HOSPITAL ENCOUNTER (OUTPATIENT)
Dept: ULTRASOUND IMAGING | Facility: CLINIC | Age: 39
Discharge: HOME | End: 2019-05-23
Attending: SURGERY | Admitting: SURGERY
Payer: COMMERCIAL

## 2019-05-23 ENCOUNTER — HOSPITAL ENCOUNTER (OUTPATIENT)
Dept: ULTRASOUND IMAGING | Facility: CLINIC | Age: 39
End: 2019-05-23
Attending: SURGERY
Payer: COMMERCIAL

## 2019-05-23 DIAGNOSIS — I73.9 CLAUDICATION (H): ICD-10-CM

## 2019-05-23 PROCEDURE — 93924 LWR XTR VASC STDY BILAT: CPT

## 2019-05-23 PROCEDURE — 92507 TX SP LANG VOICE COMM INDIV: CPT | Mod: GN | Performed by: SPEECH-LANGUAGE PATHOLOGIST

## 2019-05-23 PROCEDURE — 93926 LOWER EXTREMITY STUDY: CPT | Mod: RT

## 2019-05-28 ENCOUNTER — HOSPITAL ENCOUNTER (OUTPATIENT)
Dept: OCCUPATIONAL THERAPY | Facility: CLINIC | Age: 39
Setting detail: THERAPIES SERIES
End: 2019-05-28
Attending: INTERNAL MEDICINE
Payer: COMMERCIAL

## 2019-05-28 ENCOUNTER — HOSPITAL ENCOUNTER (OUTPATIENT)
Dept: SPEECH THERAPY | Facility: CLINIC | Age: 39
Setting detail: THERAPIES SERIES
End: 2019-05-28
Attending: INTERNAL MEDICINE
Payer: COMMERCIAL

## 2019-05-28 PROCEDURE — 92507 TX SP LANG VOICE COMM INDIV: CPT | Mod: GN | Performed by: SPEECH-LANGUAGE PATHOLOGIST

## 2019-05-28 PROCEDURE — 97530 THERAPEUTIC ACTIVITIES: CPT | Mod: GO | Performed by: OCCUPATIONAL THERAPIST

## 2019-05-28 PROCEDURE — 97112 NEUROMUSCULAR REEDUCATION: CPT | Mod: GO | Performed by: OCCUPATIONAL THERAPIST

## 2019-05-29 ENCOUNTER — HOSPITAL ENCOUNTER (OUTPATIENT)
Dept: SPEECH THERAPY | Facility: CLINIC | Age: 39
Setting detail: THERAPIES SERIES
End: 2019-05-29
Attending: INTERNAL MEDICINE
Payer: COMMERCIAL

## 2019-05-29 PROCEDURE — 92507 TX SP LANG VOICE COMM INDIV: CPT | Mod: GN | Performed by: SPEECH-LANGUAGE PATHOLOGIST

## 2019-05-30 ENCOUNTER — HOSPITAL ENCOUNTER (OUTPATIENT)
Dept: SPEECH THERAPY | Facility: CLINIC | Age: 39
Setting detail: THERAPIES SERIES
End: 2019-05-30
Attending: INTERNAL MEDICINE
Payer: COMMERCIAL

## 2019-05-30 PROCEDURE — 92507 TX SP LANG VOICE COMM INDIV: CPT | Mod: GN | Performed by: SPEECH-LANGUAGE PATHOLOGIST

## 2019-05-31 ENCOUNTER — HOSPITAL ENCOUNTER (OUTPATIENT)
Dept: SPEECH THERAPY | Facility: CLINIC | Age: 39
Setting detail: THERAPIES SERIES
End: 2019-05-31
Attending: INTERNAL MEDICINE
Payer: COMMERCIAL

## 2019-05-31 DIAGNOSIS — I99.8 LOWER LIMB ISCHEMIA: Primary | ICD-10-CM

## 2019-05-31 DIAGNOSIS — I73.9 CLAUDICATION (H): ICD-10-CM

## 2019-05-31 PROCEDURE — 92507 TX SP LANG VOICE COMM INDIV: CPT | Mod: GN | Performed by: SPEECH-LANGUAGE PATHOLOGIST

## 2019-06-03 ENCOUNTER — HOSPITAL ENCOUNTER (OUTPATIENT)
Dept: OCCUPATIONAL THERAPY | Facility: CLINIC | Age: 39
Setting detail: THERAPIES SERIES
End: 2019-06-03
Attending: INTERNAL MEDICINE
Payer: COMMERCIAL

## 2019-06-03 PROCEDURE — 97530 THERAPEUTIC ACTIVITIES: CPT | Mod: GO | Performed by: REHABILITATION PRACTITIONER

## 2019-06-03 PROCEDURE — 97110 THERAPEUTIC EXERCISES: CPT | Mod: GO | Performed by: REHABILITATION PRACTITIONER

## 2019-06-03 NOTE — ADDENDUM NOTE
Encounter addended by: Felicitas Coffey, SLP on: 6/3/2019 8:46 AM   Actions taken: Flowsheet accepted

## 2019-06-04 ENCOUNTER — HOSPITAL ENCOUNTER (OUTPATIENT)
Dept: SPEECH THERAPY | Facility: CLINIC | Age: 39
Setting detail: THERAPIES SERIES
End: 2019-06-04
Attending: INTERNAL MEDICINE
Payer: COMMERCIAL

## 2019-06-04 PROCEDURE — 92507 TX SP LANG VOICE COMM INDIV: CPT | Mod: GN | Performed by: SPEECH-LANGUAGE PATHOLOGIST

## 2019-06-06 ENCOUNTER — HOSPITAL ENCOUNTER (OUTPATIENT)
Dept: SPEECH THERAPY | Facility: CLINIC | Age: 39
Setting detail: THERAPIES SERIES
End: 2019-06-06
Attending: INTERNAL MEDICINE
Payer: COMMERCIAL

## 2019-06-06 PROCEDURE — 92507 TX SP LANG VOICE COMM INDIV: CPT | Mod: GN | Performed by: SPEECH-LANGUAGE PATHOLOGIST

## 2019-06-07 ENCOUNTER — HOSPITAL ENCOUNTER (OUTPATIENT)
Dept: SPEECH THERAPY | Facility: CLINIC | Age: 39
Setting detail: THERAPIES SERIES
End: 2019-06-07
Attending: INTERNAL MEDICINE
Payer: COMMERCIAL

## 2019-06-07 PROCEDURE — 92507 TX SP LANG VOICE COMM INDIV: CPT | Mod: GN | Performed by: SPEECH-LANGUAGE PATHOLOGIST

## 2019-06-10 ENCOUNTER — HOSPITAL ENCOUNTER (OUTPATIENT)
Dept: OCCUPATIONAL THERAPY | Facility: CLINIC | Age: 39
Setting detail: THERAPIES SERIES
End: 2019-06-10
Attending: INTERNAL MEDICINE
Payer: COMMERCIAL

## 2019-06-10 PROCEDURE — 97530 THERAPEUTIC ACTIVITIES: CPT | Mod: GO | Performed by: REHABILITATION PRACTITIONER

## 2019-06-10 PROCEDURE — 97537 COMMUNITY/WORK REINTEGRATION: CPT | Mod: GO | Performed by: REHABILITATION PRACTITIONER

## 2019-06-11 ENCOUNTER — HOSPITAL ENCOUNTER (OUTPATIENT)
Dept: SPEECH THERAPY | Facility: CLINIC | Age: 39
Setting detail: THERAPIES SERIES
End: 2019-06-11
Attending: INTERNAL MEDICINE
Payer: COMMERCIAL

## 2019-06-11 PROCEDURE — 92507 TX SP LANG VOICE COMM INDIV: CPT | Mod: GN | Performed by: SPEECH-LANGUAGE PATHOLOGIST

## 2019-06-12 ENCOUNTER — HOSPITAL ENCOUNTER (OUTPATIENT)
Dept: SPEECH THERAPY | Facility: CLINIC | Age: 39
Setting detail: THERAPIES SERIES
End: 2019-06-12
Attending: INTERNAL MEDICINE
Payer: COMMERCIAL

## 2019-06-12 PROCEDURE — 92507 TX SP LANG VOICE COMM INDIV: CPT | Mod: GN | Performed by: SPEECH-LANGUAGE PATHOLOGIST

## 2019-06-13 ENCOUNTER — HOSPITAL ENCOUNTER (OUTPATIENT)
Dept: SPEECH THERAPY | Facility: CLINIC | Age: 39
Setting detail: THERAPIES SERIES
End: 2019-06-13
Attending: INTERNAL MEDICINE
Payer: COMMERCIAL

## 2019-06-13 PROCEDURE — 92507 TX SP LANG VOICE COMM INDIV: CPT | Mod: GN | Performed by: SPEECH-LANGUAGE PATHOLOGIST

## 2019-06-14 ENCOUNTER — HOSPITAL ENCOUNTER (OUTPATIENT)
Dept: SPEECH THERAPY | Facility: CLINIC | Age: 39
Setting detail: THERAPIES SERIES
End: 2019-06-14
Attending: INTERNAL MEDICINE
Payer: COMMERCIAL

## 2019-06-14 ENCOUNTER — HOSPITAL ENCOUNTER (OUTPATIENT)
Dept: OCCUPATIONAL THERAPY | Facility: CLINIC | Age: 39
Setting detail: THERAPIES SERIES
End: 2019-06-14
Attending: INTERNAL MEDICINE
Payer: COMMERCIAL

## 2019-06-14 PROCEDURE — 97537 COMMUNITY/WORK REINTEGRATION: CPT | Mod: GO | Performed by: OCCUPATIONAL THERAPIST

## 2019-06-14 PROCEDURE — 92507 TX SP LANG VOICE COMM INDIV: CPT | Mod: GN | Performed by: SPEECH-LANGUAGE PATHOLOGIST

## 2019-06-18 ENCOUNTER — HOSPITAL ENCOUNTER (OUTPATIENT)
Dept: SPEECH THERAPY | Facility: CLINIC | Age: 39
Setting detail: THERAPIES SERIES
End: 2019-06-18
Attending: INTERNAL MEDICINE
Payer: COMMERCIAL

## 2019-06-18 ENCOUNTER — HOSPITAL ENCOUNTER (OUTPATIENT)
Dept: OCCUPATIONAL THERAPY | Facility: CLINIC | Age: 39
Setting detail: THERAPIES SERIES
End: 2019-06-18
Attending: INTERNAL MEDICINE
Payer: COMMERCIAL

## 2019-06-18 PROCEDURE — 92507 TX SP LANG VOICE COMM INDIV: CPT | Mod: GN | Performed by: SPEECH-LANGUAGE PATHOLOGIST

## 2019-06-18 PROCEDURE — 97537 COMMUNITY/WORK REINTEGRATION: CPT | Mod: GO | Performed by: REHABILITATION PRACTITIONER

## 2019-06-19 ENCOUNTER — HOSPITAL ENCOUNTER (OUTPATIENT)
Dept: SPEECH THERAPY | Facility: CLINIC | Age: 39
Setting detail: THERAPIES SERIES
End: 2019-06-19
Attending: INTERNAL MEDICINE
Payer: COMMERCIAL

## 2019-06-19 PROCEDURE — 92507 TX SP LANG VOICE COMM INDIV: CPT | Mod: GN | Performed by: SPEECH-LANGUAGE PATHOLOGIST

## 2019-06-20 ENCOUNTER — HOSPITAL ENCOUNTER (OUTPATIENT)
Dept: OCCUPATIONAL THERAPY | Facility: CLINIC | Age: 39
Setting detail: THERAPIES SERIES
End: 2019-06-20
Attending: INTERNAL MEDICINE
Payer: COMMERCIAL

## 2019-06-20 ENCOUNTER — HOSPITAL ENCOUNTER (OUTPATIENT)
Dept: SPEECH THERAPY | Facility: CLINIC | Age: 39
Setting detail: THERAPIES SERIES
End: 2019-06-20
Attending: INTERNAL MEDICINE
Payer: COMMERCIAL

## 2019-06-20 PROCEDURE — 97537 COMMUNITY/WORK REINTEGRATION: CPT | Mod: GO | Performed by: REHABILITATION PRACTITIONER

## 2019-06-20 PROCEDURE — 92507 TX SP LANG VOICE COMM INDIV: CPT | Mod: GN | Performed by: SPEECH-LANGUAGE PATHOLOGIST

## 2019-06-20 PROCEDURE — 97535 SELF CARE MNGMENT TRAINING: CPT | Mod: GO | Performed by: REHABILITATION PRACTITIONER

## 2019-06-20 NOTE — PROGRESS NOTES
"Outpatient Speech Language Pathology Progress Note     Patient: Srinivasa Casas  : 1980    Beginning/End Dates of Reporting Period:  2019 to 2019    Referring Provider: Dr. Naheed MD     Therapy Diagnosis: Severe Global Aphasia, Severe Apraxia     Client Self Report: Pt is a 39 y.o. Male who completed an OP SLP evaluation on 2019 following STEMI and L MCA CVA. Please see the initial evaluation report for further health history. At this time Pt has completed 26 visits since the start of care with a heavy focus on apraxia drills for speech and expressive/receptive language skills. Pt greeted SLP with a verbal response of \"good\" for the first time on  provided direct model. Pt spouse reports increased words and phrases at home for communication, however, Pt is not consistent in use of any word or phrase at this time. Speech and language continues to be marked by significant jargon. Pt and family are planning to participate in the [facility] Intensive Aphasia program in September pending acceptance, at that time OP therapy will be suspected for the duration of Pt participation in that program.    Objective Measurements: See below:             Goals:  Goal Identifier STG 9-Cxzmthct-Isvynb Expression (V/E)   Goal Description Pt will complete a basic word finding task (picture naming, word assoications, ect) provided max cues and 80% accuracy in order to request desired items verbally.    Target Date 19   Date Met   2019   Progress: Goal met, Pt demonstrates 80% accuracy I provided F:3 word choices. See modified goal below. At time of eval. Pt achieved .06% ()     Goal Identifier STG 7-Pcdjphcq-Lqabmdn Expression (W/E)   Goal Description Pt will complete a basic written expression task (name, address, ROCIO, JESSICA) provided max cues and 80% accuracy in order to sign his name independently.    Target Date 19-Extend goal 2019   Date Met      Progress: Goal not met, Pt " is able to write his name I and address (street name, house number, city, but not zip code)=100% I. Pt demonstrates 57% I for the alphabet and improves to 77% provided max cues. Pt demonstrating 14% for writing letters to dictation. Continue goal. At time of evaluation, Pt could write some of name and address, but unable to independently write alphabet 0%.      Goal Identifier STG 3-Language-Auditory Comprehension (A/C)   Goal Description Pt will complete a basic auditory comprehension task (i.e. 1 step directions) provided max cues and 80% accuracy in order to understand directions for task completion.    Target Date 07/22/19-Extend goal 9/17/2019   Date Met      Progress: Goal not met, Pt demonstrating 46% I for yes/no questions, improves to 50% provided mod cues. Continue goal. Pt scored a 0 on all A/C during evaluation.       Goal Identifier STG 7-Fbyqpzcw-Bjbwaas Comprehension (R/C)   Goal Description Pt will complete a basic level R/C task (i.e. read a direction) provided mod cues and 80% accuracy in order to follow written directions/steps to a task independently.    Target Date 07/22/19   Date Met   6/14/2019   Progress: Goal met, Pt demonstrates an average of 62% I when reading a question and selecting the correct answer from F:3, Pt improves to 92% provided moderate cues. At time of eval Pt scored 20% on functional comprehension of sentences and paragraphs.      Goal Identifier STG 5-Speech(NEW GOAL 5/15/19)   Goal Description Pt will demonstrate 80% accuracy for single sound production, CVC words and rote phrases provided max cues, reps, and models in order to verbally communicate basic wants and needs.    Target Date 07/22/19   Date Met   6/19/2019   Progress: Goal partially met, Pt demonstrating 100% accuracy for CVC words provided MAX cues, reps, phrases, and biofeedback in a mirror. Goal added on 5/15/19 and with Pt progress apraxia became more clear rather than expressive language deficit. Pt unable  to complete any imitation of sounds at initial evaluation, 0%.  See modified goal below.      Goal Identifier  STG 1A-Verbal Expression (NEW GOAL)   Goal Description Pt will complete a basic to moderate level word finding task (generative naming, synonyms, opposites, categoriest) provided 0-min cues and 80% accuracy in order to request desired items verbally.    Target Date  9/17/2019   Date Met      Progress:     Goal Identifier  STG 4A-Reading (NEW GOAL)    Goal Description  Pt will complete a basic level R/C task (i.e. read a direction) provided 0-min cues and 80% accuracy in order to follow written directions/steps to a task independently.    Target Date  9/17/2019   Date Met      Progress:     Goal Identifier  STG 5A-Speech (NEW/Modified GOAL)   Goal Description Pt will demonstrate 80% accuracy for CVC words and rote phrases (2-4 syllables) provided mod to max cues, reps, and models in order to verbally communicate basic wants and needs.    Target Date  9/17/2019   Date Met      Progress:     Progress Toward Goals:   Progress this reporting period: Pt has met his goal for verbal expression, reading comprehension, and has partially met his goal for speech (apraxia targets). Pt is highly motivated and participates enthusiastically in therapy sessions. Pt has an excellent support system at home from his spouse and 6 children who are involved in daily home practice. Pt continues to demonstrate severe impairments in apraxia and global aphasia, Pt is not able to communicate basic wants and needs at this time. RECOMMEND: 5x/week of skilled services addressing the goal above for 90 days.        Plan:  Changes to therapy plan of care: Please see grid above for updates to goals.     Discharge:  No    Thank you for referring Srinivasa Shoemakerfrancisco to outpatient therapy at  adult rehabilitation in [place].  Please call Felicitas Coffey MA, SLP-CCC at [phone] or email [email or web address] with any questions or concerns.       Felicitas Coffey M.A., SLP-CCC  Speech-Language Pathologist

## 2019-06-20 NOTE — PROGRESS NOTES
[facility]          OUTPATIENT SPEECH LANGUAGE PATHOLOGY LANGUAGE-COGNITION  EVALUATION  PLAN OF TREATMENT FOR OUTPATIENT REHABILITATION  (COMPLETE FOR INITIAL CLAIMS ONLY)  Patient's Last Name, First Name, M.I.  YOB: 1980  Srinivasa Casas                           Provider s Name: [facility] Medical Record No.  [id number]     Onset Date:   3/13/2019   Start of Care Date:  4/23/2019   Type:     ___PT  __OT   _X_SLP    Medical Diagnosis:   CVA   Speech Language Pathology Diagnosis:   Severe Global Aphasia, Severe apraxia     Visits from SOC: 1                                        ________________________________________________________________________________  Plan of Treatment/Functional Goals:            Language / Cognition Goals  1. Goal Identifier: STG 5G-Btsbvlro-Uxyfkx Expression (V/E)       Goal Description: Pt will complete a basic to moderate level word finding task (generative naming, synonyms, opposites, categoriest) provided 0-min cues and 80% accuracy in order to request desired items verbally.        Target Date: 09/17/19   2. Goal Identifier: STG 5-Vqgezylv-Xzgfmqh Expression (W/E)       Goal Description: Pt will complete a basic written expression task (name, address, ROCIO, JESSICA) provided max cues and 80% accuracy in order to sign his name independently.        Target Date: 09/17/19   3. Goal Identifier: STG 3-Language-Auditory Comprehension (A/C)       Goal Description: Pt will complete a basic auditory comprehension task (i.e. 1 step directions) provided max cues and 80% accuracy in order to understand directions for task completion.        Target Date: 09/17/19   4. Goal Identifier: STG 5E-Olprgjmx-Xlhpfns Comprehension (R/C)       Goal Description: Pt will complete a basic level R/C task (i.e. read a direction) provided 0-min cues and 80% accuracy in order to  follow written directions/steps to a task independently.       Target Date: 09/17/19   5. Goal Identifier: STG 5A-Speech       Goal Description: Pt will demonstrate 80% accuracy for CVC words and rote phrases (2-4 syllables) provided mod to max cues, reps, and models in order to verbally communicate basic wants and needs.        Target Date: 09/17/19            Felicitas Coffey MA CCC-SLP       I CERTIFY THE NEED FOR THESE SERVICES FURNISHED UNDER        THIS PLAN OF TREATMENT AND WHILE UNDER MY CARE .             Physician Signature               Date    X_____________________________________________________                        Certification Date From:   6/20/2019  Certification Date To:     9/17/2019         Referring Physician:   Dr. Naheed MD/ Dr. Parish Thomas MD (PCP)     Initial Assessment        See Epic Evaluation  Dated 4/23/2019

## 2019-06-21 ENCOUNTER — HOSPITAL ENCOUNTER (OUTPATIENT)
Dept: SPEECH THERAPY | Facility: CLINIC | Age: 39
Setting detail: THERAPIES SERIES
End: 2019-06-21
Attending: INTERNAL MEDICINE
Payer: COMMERCIAL

## 2019-06-21 PROCEDURE — 92507 TX SP LANG VOICE COMM INDIV: CPT | Mod: GN | Performed by: SPEECH-LANGUAGE PATHOLOGIST

## 2019-06-24 ENCOUNTER — HOSPITAL ENCOUNTER (OUTPATIENT)
Dept: OCCUPATIONAL THERAPY | Facility: CLINIC | Age: 39
Setting detail: THERAPIES SERIES
End: 2019-06-24
Attending: INTERNAL MEDICINE
Payer: COMMERCIAL

## 2019-06-24 ENCOUNTER — TELEPHONE (OUTPATIENT)
Dept: OTHER | Facility: CLINIC | Age: 39
End: 2019-06-24

## 2019-06-24 PROCEDURE — 97537 COMMUNITY/WORK REINTEGRATION: CPT | Mod: GO | Performed by: REHABILITATION PRACTITIONER

## 2019-06-24 PROCEDURE — 97535 SELF CARE MNGMENT TRAINING: CPT | Mod: GO | Performed by: REHABILITATION PRACTITIONER

## 2019-06-24 NOTE — LETTER
[facility] at [address] [place] [address]  [place] [address]  Phone: [phone]  Fax: [phone]      June 27, 2019    Srinivasa Casas  [address] [place] [facility] [id number]    TO WHOM IT MAY CONCERN,    Srinivasa Yessenia has been under my professional medical care, due to patients medical condition it is not recommended that he travel at this time.    Sincerely,      Bethel Joe MD    [facility]

## 2019-06-24 NOTE — TELEPHONE ENCOUNTER
Wife, Evelyn, called and said that she needs a note stating that Srinivasa can't fly at this time.  Evelyn states that they talked about it at the last office visit but she had already booked the flights and now needs a note from our office so she is able to be refunded for the tickets.  Evelyn is asking if she can get the note today, but I told her that Dr. Joe is not in the office today so it was unlikely we could get her a note today.  I told her that I will route a note to Dr. Joe's nurse to see if there is anyway for her to get a note today.  Please call Evelyn back to let her know a timeframe. Kim Allne,      no difficulties

## 2019-06-25 ENCOUNTER — HOSPITAL ENCOUNTER (OUTPATIENT)
Dept: SPEECH THERAPY | Facility: CLINIC | Age: 39
Setting detail: THERAPIES SERIES
End: 2019-06-25
Attending: INTERNAL MEDICINE
Payer: COMMERCIAL

## 2019-06-25 PROCEDURE — 92507 TX SP LANG VOICE COMM INDIV: CPT | Mod: GN | Performed by: SPEECH-LANGUAGE PATHOLOGIST

## 2019-06-26 ENCOUNTER — HOSPITAL ENCOUNTER (OUTPATIENT)
Dept: SPEECH THERAPY | Facility: CLINIC | Age: 39
Setting detail: THERAPIES SERIES
End: 2019-06-26
Attending: INTERNAL MEDICINE
Payer: COMMERCIAL

## 2019-06-26 PROCEDURE — 92507 TX SP LANG VOICE COMM INDIV: CPT | Mod: GN | Performed by: SPEECH-LANGUAGE PATHOLOGIST

## 2019-06-27 ENCOUNTER — HOSPITAL ENCOUNTER (OUTPATIENT)
Dept: SPEECH THERAPY | Facility: CLINIC | Age: 39
Setting detail: THERAPIES SERIES
End: 2019-06-27
Attending: INTERNAL MEDICINE
Payer: COMMERCIAL

## 2019-06-27 PROCEDURE — 92507 TX SP LANG VOICE COMM INDIV: CPT | Mod: GN | Performed by: SPEECH-LANGUAGE PATHOLOGIST

## 2019-06-27 NOTE — TELEPHONE ENCOUNTER
Discussed with Dr. Joe.     Letter for pt re travel not advised at this time placed in Epic.     I called pts wife letter will be at  desk for , Pts wife notes understanding.     DOROTHEA WareN, RN

## 2019-06-28 ENCOUNTER — HOSPITAL ENCOUNTER (OUTPATIENT)
Dept: SPEECH THERAPY | Facility: CLINIC | Age: 39
Setting detail: THERAPIES SERIES
End: 2019-06-28
Attending: INTERNAL MEDICINE
Payer: COMMERCIAL

## 2019-06-28 PROCEDURE — 92507 TX SP LANG VOICE COMM INDIV: CPT | Mod: GN | Performed by: SPEECH-LANGUAGE PATHOLOGIST

## 2019-07-10 ENCOUNTER — HOSPITAL ENCOUNTER (OUTPATIENT)
Dept: SPEECH THERAPY | Facility: CLINIC | Age: 39
Setting detail: THERAPIES SERIES
End: 2019-07-10
Attending: INTERNAL MEDICINE
Payer: COMMERCIAL

## 2019-07-10 PROCEDURE — 92507 TX SP LANG VOICE COMM INDIV: CPT | Mod: GN | Performed by: SPEECH-LANGUAGE PATHOLOGIST

## 2019-07-11 ENCOUNTER — HOSPITAL ENCOUNTER (OUTPATIENT)
Dept: OCCUPATIONAL THERAPY | Facility: CLINIC | Age: 39
Setting detail: THERAPIES SERIES
End: 2019-07-11
Attending: INTERNAL MEDICINE
Payer: COMMERCIAL

## 2019-07-11 ENCOUNTER — HOSPITAL ENCOUNTER (OUTPATIENT)
Dept: SPEECH THERAPY | Facility: CLINIC | Age: 39
Setting detail: THERAPIES SERIES
End: 2019-07-11
Attending: INTERNAL MEDICINE
Payer: COMMERCIAL

## 2019-07-11 PROCEDURE — 92507 TX SP LANG VOICE COMM INDIV: CPT | Mod: GN | Performed by: SPEECH-LANGUAGE PATHOLOGIST

## 2019-07-11 PROCEDURE — 97535 SELF CARE MNGMENT TRAINING: CPT | Mod: GO | Performed by: REHABILITATION PRACTITIONER

## 2019-07-11 PROCEDURE — 97530 THERAPEUTIC ACTIVITIES: CPT | Mod: GO | Performed by: REHABILITATION PRACTITIONER

## 2019-07-12 ENCOUNTER — HOSPITAL ENCOUNTER (OUTPATIENT)
Dept: SPEECH THERAPY | Facility: CLINIC | Age: 39
Setting detail: THERAPIES SERIES
End: 2019-07-12
Attending: INTERNAL MEDICINE
Payer: COMMERCIAL

## 2019-07-12 PROCEDURE — 92507 TX SP LANG VOICE COMM INDIV: CPT | Mod: GN | Performed by: SPEECH-LANGUAGE PATHOLOGIST

## 2019-07-16 ENCOUNTER — HOSPITAL ENCOUNTER (OUTPATIENT)
Dept: SPEECH THERAPY | Facility: CLINIC | Age: 39
Setting detail: THERAPIES SERIES
End: 2019-07-16
Attending: INTERNAL MEDICINE
Payer: COMMERCIAL

## 2019-07-16 PROCEDURE — 92507 TX SP LANG VOICE COMM INDIV: CPT | Mod: GN | Performed by: SPEECH-LANGUAGE PATHOLOGIST

## 2019-07-17 ENCOUNTER — HOSPITAL ENCOUNTER (OUTPATIENT)
Dept: SPEECH THERAPY | Facility: CLINIC | Age: 39
Setting detail: THERAPIES SERIES
End: 2019-07-17
Attending: INTERNAL MEDICINE
Payer: COMMERCIAL

## 2019-07-17 ENCOUNTER — HOSPITAL ENCOUNTER (OUTPATIENT)
Dept: OCCUPATIONAL THERAPY | Facility: CLINIC | Age: 39
Setting detail: THERAPIES SERIES
End: 2019-07-17
Attending: INTERNAL MEDICINE
Payer: COMMERCIAL

## 2019-07-17 PROCEDURE — 92507 TX SP LANG VOICE COMM INDIV: CPT | Mod: GN | Performed by: SPEECH-LANGUAGE PATHOLOGIST

## 2019-07-17 PROCEDURE — 97537 COMMUNITY/WORK REINTEGRATION: CPT | Mod: GO | Performed by: OCCUPATIONAL THERAPIST

## 2019-07-18 ENCOUNTER — HOSPITAL ENCOUNTER (OUTPATIENT)
Dept: SPEECH THERAPY | Facility: CLINIC | Age: 39
Setting detail: THERAPIES SERIES
End: 2019-07-18
Attending: INTERNAL MEDICINE
Payer: COMMERCIAL

## 2019-07-18 PROCEDURE — 92507 TX SP LANG VOICE COMM INDIV: CPT | Mod: GN | Performed by: SPEECH-LANGUAGE PATHOLOGIST

## 2019-07-19 ENCOUNTER — HOSPITAL ENCOUNTER (OUTPATIENT)
Dept: SPEECH THERAPY | Facility: CLINIC | Age: 39
Setting detail: THERAPIES SERIES
End: 2019-07-19
Attending: INTERNAL MEDICINE
Payer: COMMERCIAL

## 2019-07-19 PROCEDURE — 92507 TX SP LANG VOICE COMM INDIV: CPT | Mod: GN | Performed by: SPEECH-LANGUAGE PATHOLOGIST

## 2019-07-22 NOTE — ADDENDUM NOTE
Encounter addended by: Felicitas Coffey, SLP on: 7/22/2019 12:22 PM   Actions taken: Sign clinical note

## 2019-07-22 NOTE — PROGRESS NOTES
Outpatient Speech Language Pathology Progress Note     Patient: Srinivasa Casas  : 1980    Beginning/End Dates of Reporting Period:  2019 to 2019    Referring Provider: Dr. Naheed MD     Therapy Diagnosis: Severe Global Aphasia, severe apraxia.     Client Self Report: Pt is a 39 y.o. Male who completed an OP SLP evaluation on 2019, please see the initial evaluation report in Epic for further information. Pt has completed 12 treatment sessions during this reporting period with main focus on apraxia drills. Pt and family are planning to complete an evaluation to determine candidacy for the [facility] Intensive Aphasia Program in early to mid August with the intent to attend the September session at which time OP services through [facility] will be discontinued.     Objective Measurements: See below:           Goals:  Goal Identifier STG 4N-Jstagwwr-Kbassq Expression (V/E)   Goal Description Pt will complete a basic to moderate level word finding task (generative naming, synonyms, opposites, categoriest) provided 0-min cues and 80% accuracy in order to request desired items verbally.    Target Date 19   Date Met      Progress:Pt has increased to moderate level word finding tasks and is demonstrating 45-50% accuracy I (unscrambling synonym pairs/deducing compound words by description) and improves to % provided mod to max cues.      Goal Identifier STG 6-Zjoycjlq-Oijoiwx Expression (W/E)   Goal Description Pt will complete a basic written expression task (name, address, ROCIO, JESSICA) provided max cues and 80% accuracy in order to sign his name independently.    Target Date 19   Date Met      Progress:Goal not met. Pt has improved from 57% I to 66% I for writing the alphabet, for errored letters Pt has improved from 77% with max cues to 89% with mod cues. Pt has improved from 14% to 45% for writing leters to dictation, and is now able to fill in missing letters in 1-2 syllable words  with a picture cue=42% I. Continue goal.      Goal Identifier STG 3-Language-Auditory Comprehension (A/C)   Goal Description Pt will complete a basic auditory comprehension task (i.e. 1 step directions) provided max cues and 80% accuracy in order to understand directions for task completion.    Target Date 09/17/19   Date Met      Progress: Goal not addressed during this treatment period.      Goal Identifier STG 6R-Vzshdqlm-Wuvbcnr Comprehension (R/C)   Goal Description Pt will complete a basic level R/C task (i.e. read a direction) provided 0-min cues and 80% accuracy in order to follow written directions/steps to a task independently.   Target Date 09/17/19   Date Met      Progress: Pt has progressed from single sentences to short (2-3 sentence) paragraphs with yes/no comprehension=50% I. Continue goal.      Goal Identifier STG 5A-Speech   Goal Description Pt will demonstrate 80% accuracy for CVC words and rote phrases (2-4 syllables) provided mod to max cues, reps, and models in order to verbally communicate basic wants and needs.    Target Date 09/17/19   Date Met      Progress:Pt has not yet demonstrated consistent success with a 2 syllable word. He is 40% I for CVC words and improves to 100% provided mod to max cues , reps, and models.(previously Pt was not not able to complete this task independently and consistently needed max cues, reps, and models). For a variety of VC and CV words Pt is </= 50% I and improves to 80% provided with mod cues with varied consistency from day to day.          Progress Toward Goals:    Progress this reporting period: Pt is now completing moderate level word finding tasks with 45-50% accuracy I. He has increased his accuracy and consistently in W/E to 66% I and is now able to fill in missing letters in words provided a picture cue with 42% I. Pt has increased from single to multiple sentences in R/C and is 50% I. Speech continues to be a significant barrier for daily  communication and is inconsistent from day to day, Pt is demonstrating increased accuracy for VC/CV/CVC words provided a moderate rather than maximal level of cueing. Pt continues to demonstrate severe global aphasia and severe apraxia, he is unable to communicate basic wants and needs to familiar listeners. Pt and spouse are highly motivated, and engaged, Pt consistently completes all HEP with mod to max A from spouse. RECOMMEND: 4x/week to address the goals outlined above for an additional 60 days.       Plan:  Continue therapy per current plan of care.    Discharge:  No    Thank you for referring Srinivasa Gargpam to outpatient therapy at  adult [facility] in [place].  Please call Felicitas Coffey MA, SLP-CCC at [phone] or email [email or web address] with any questions or concerns.      Felicitas Coffey M.A., SLP-CCC  Speech-Language Pathologist

## 2019-07-23 ENCOUNTER — HOSPITAL ENCOUNTER (OUTPATIENT)
Dept: SPEECH THERAPY | Facility: CLINIC | Age: 39
Setting detail: THERAPIES SERIES
End: 2019-07-23
Attending: INTERNAL MEDICINE
Payer: COMMERCIAL

## 2019-07-23 PROCEDURE — 92507 TX SP LANG VOICE COMM INDIV: CPT | Mod: GN | Performed by: SPEECH-LANGUAGE PATHOLOGIST

## 2019-07-24 ENCOUNTER — HOSPITAL ENCOUNTER (OUTPATIENT)
Dept: SPEECH THERAPY | Facility: CLINIC | Age: 39
Setting detail: THERAPIES SERIES
End: 2019-07-24
Attending: INTERNAL MEDICINE
Payer: COMMERCIAL

## 2019-07-24 PROCEDURE — 92507 TX SP LANG VOICE COMM INDIV: CPT | Mod: GN | Performed by: SPEECH-LANGUAGE PATHOLOGIST

## 2019-07-25 ENCOUNTER — HOSPITAL ENCOUNTER (OUTPATIENT)
Dept: SPEECH THERAPY | Facility: CLINIC | Age: 39
Setting detail: THERAPIES SERIES
End: 2019-07-25
Attending: INTERNAL MEDICINE
Payer: COMMERCIAL

## 2019-07-25 ENCOUNTER — HOSPITAL ENCOUNTER (OUTPATIENT)
Dept: OCCUPATIONAL THERAPY | Facility: CLINIC | Age: 39
Setting detail: THERAPIES SERIES
End: 2019-07-25
Attending: INTERNAL MEDICINE
Payer: COMMERCIAL

## 2019-07-25 PROCEDURE — 92507 TX SP LANG VOICE COMM INDIV: CPT | Mod: GN | Performed by: SPEECH-LANGUAGE PATHOLOGIST

## 2019-07-25 PROCEDURE — 97537 COMMUNITY/WORK REINTEGRATION: CPT | Mod: GO | Performed by: REHABILITATION PRACTITIONER

## 2019-07-25 NOTE — PROGRESS NOTES
[facility]          OUTPATIENT OCCUPATIONAL THERAPY  EVALUATION  PLAN OF TREATMENT FOR OUTPATIENT REHABILITATION  (COMPLETE FOR INITIAL CLAIMS ONLY)  Patient's Last Name, First Name, M.I.  YOB: 1980  Srinivasa Casas                           Provider's Name  [facility] Medical Record No.  [id number]                               Onset Date:   3/13/19      Start of Care Date:      4/22/19   Type:     ___PT   _X_OT   ___SLP Medical Diagnosis:   CVI with hemiplegia and hemiparesis                             OT Diagnosis:   Impaired ADL/IADL     Visits from SOC:  16   _________________________________________________________________________________  Plan of Treatment/Functional Goals:         Goals  Goal Identifier: 1-Strength  Goal Description: Pt will demonstrate independence with UE strength HEP in order to return to baseline function for work tasks.  Target Date: 10/9/19     Goal Identifier: 2-community reintegration  Goal Description: Pt will demonstrate mod I with visual scanning, attention, and problem-solving for increased safety with community reintegration.  Target Date: 10/9/19     Goal Identifier: 3-Medication management  Goal Description: Pt will demonstrate ability to accurately set up 4 new moderately complex medications for increased safety and independence with medication management.  Target Date: 10/9/19     Goal Identifier: 4-Managing routine  Goal Description: Pt will demonstrate mod I with following a routine (planning his day, getting ready, etc) 50% of the time for increased independence with ADL's/IADL's  Target Date: 10/9/19     Goal Identifier: 5-Work  Goal Description: Pt will perform simulated work activity with 90% accuracy and min cues for return to work tasks.  Target Date: 10/9/19     Goal Identifier: 6 Meal prep  Goal Description: Pt will complete  simple meal prep task safely with mod I.   Target Date: 10/9/19      Pt demonstrates improvement towards goals stated.  All goals remain appropriate.  Pt's severe aphasia is impacting his ADL/IADL performance and independence.  Pt is demonstrating improvement in following directions and identifying pertinent information in sentences.  Recommend continued OT at this time.  Pt demonstrates ability to learn new information, has good family support and is motivated to improve.  Recommend pt attend intensive aphasia program and will hold OT during that time.        Jeannette Jones, OT          I CERTIFY THE NEED FOR THESE SERVICES FURNISHED UNDER        THIS PLAN OF TREATMENT AND WHILE UNDER MY CARE .             Physician Signature               Date    X_____________________________________________________      Dr. Farfan, [facility]     Initial Assessment        See Epic Evaluation

## 2019-07-26 ENCOUNTER — HOSPITAL ENCOUNTER (OUTPATIENT)
Dept: SPEECH THERAPY | Facility: CLINIC | Age: 39
Setting detail: THERAPIES SERIES
End: 2019-07-26
Attending: INTERNAL MEDICINE
Payer: COMMERCIAL

## 2019-07-26 PROCEDURE — 92507 TX SP LANG VOICE COMM INDIV: CPT | Mod: GN | Performed by: SPEECH-LANGUAGE PATHOLOGIST

## 2019-07-30 ENCOUNTER — HOSPITAL ENCOUNTER (OUTPATIENT)
Dept: SPEECH THERAPY | Facility: CLINIC | Age: 39
Setting detail: THERAPIES SERIES
End: 2019-07-30
Attending: INTERNAL MEDICINE
Payer: COMMERCIAL

## 2019-07-30 PROCEDURE — 92507 TX SP LANG VOICE COMM INDIV: CPT | Mod: GN | Performed by: SPEECH-LANGUAGE PATHOLOGIST

## 2019-07-31 ENCOUNTER — HOSPITAL ENCOUNTER (OUTPATIENT)
Dept: SPEECH THERAPY | Facility: CLINIC | Age: 39
Setting detail: THERAPIES SERIES
End: 2019-07-31
Attending: INTERNAL MEDICINE
Payer: COMMERCIAL

## 2019-07-31 PROCEDURE — 92507 TX SP LANG VOICE COMM INDIV: CPT | Mod: GN | Performed by: SPEECH-LANGUAGE PATHOLOGIST

## 2019-08-01 ENCOUNTER — HOSPITAL ENCOUNTER (OUTPATIENT)
Dept: SPEECH THERAPY | Facility: CLINIC | Age: 39
Setting detail: THERAPIES SERIES
End: 2019-08-01
Attending: INTERNAL MEDICINE
Payer: COMMERCIAL

## 2019-08-01 ENCOUNTER — TELEPHONE (OUTPATIENT)
Dept: OTHER | Facility: CLINIC | Age: 39
End: 2019-08-01

## 2019-08-01 ENCOUNTER — HOSPITAL ENCOUNTER (OUTPATIENT)
Dept: OCCUPATIONAL THERAPY | Facility: CLINIC | Age: 39
Setting detail: THERAPIES SERIES
End: 2019-08-01
Attending: INTERNAL MEDICINE
Payer: COMMERCIAL

## 2019-08-01 PROCEDURE — 92507 TX SP LANG VOICE COMM INDIV: CPT | Mod: GN | Performed by: SPEECH-LANGUAGE PATHOLOGIST

## 2019-08-01 PROCEDURE — 97535 SELF CARE MNGMENT TRAINING: CPT | Mod: GO | Performed by: REHABILITATION PRACTITIONER

## 2019-08-01 NOTE — TELEPHONE ENCOUNTER
"Pt hx of thrombosis of the right EIA after cardiac cath, requiring right femoral embolectomy.     Pts wife called, left vm reporting pt c/o \"inner leg hurting\" which is new. Pt has hx of aphasia thus requests to call her back to discuss and notes she is getting concerned about pt.     I called pts wife back, left .     DOROTHEA WareN, RN     "

## 2019-08-02 ENCOUNTER — HOSPITAL ENCOUNTER (OUTPATIENT)
Dept: SPEECH THERAPY | Facility: CLINIC | Age: 39
Setting detail: THERAPIES SERIES
End: 2019-08-02
Attending: INTERNAL MEDICINE
Payer: COMMERCIAL

## 2019-08-02 PROCEDURE — 92507 TX SP LANG VOICE COMM INDIV: CPT | Mod: GN | Performed by: SPEECH-LANGUAGE PATHOLOGIST

## 2019-08-06 ENCOUNTER — HOSPITAL ENCOUNTER (OUTPATIENT)
Dept: SPEECH THERAPY | Facility: CLINIC | Age: 39
Setting detail: THERAPIES SERIES
End: 2019-08-06
Attending: INTERNAL MEDICINE
Payer: COMMERCIAL

## 2019-08-06 PROCEDURE — 92507 TX SP LANG VOICE COMM INDIV: CPT | Mod: GN | Performed by: SPEECH-LANGUAGE PATHOLOGIST

## 2019-08-07 ENCOUNTER — HOSPITAL ENCOUNTER (OUTPATIENT)
Dept: SPEECH THERAPY | Facility: CLINIC | Age: 39
Setting detail: THERAPIES SERIES
End: 2019-08-07
Attending: INTERNAL MEDICINE
Payer: COMMERCIAL

## 2019-08-07 PROCEDURE — 92507 TX SP LANG VOICE COMM INDIV: CPT | Mod: GN | Performed by: SPEECH-LANGUAGE PATHOLOGIST

## 2019-08-08 ENCOUNTER — HOSPITAL ENCOUNTER (OUTPATIENT)
Dept: SPEECH THERAPY | Facility: CLINIC | Age: 39
Setting detail: THERAPIES SERIES
End: 2019-08-08
Attending: INTERNAL MEDICINE
Payer: COMMERCIAL

## 2019-08-08 PROCEDURE — 92507 TX SP LANG VOICE COMM INDIV: CPT | Mod: GN | Performed by: SPEECH-LANGUAGE PATHOLOGIST

## 2019-08-20 ENCOUNTER — HOSPITAL ENCOUNTER (OUTPATIENT)
Dept: SPEECH THERAPY | Facility: CLINIC | Age: 39
Setting detail: THERAPIES SERIES
End: 2019-08-20
Attending: INTERNAL MEDICINE
Payer: COMMERCIAL

## 2019-08-20 PROCEDURE — 92507 TX SP LANG VOICE COMM INDIV: CPT | Mod: GN | Performed by: SPEECH-LANGUAGE PATHOLOGIST

## 2019-08-21 ENCOUNTER — HOSPITAL ENCOUNTER (OUTPATIENT)
Dept: SPEECH THERAPY | Facility: CLINIC | Age: 39
Setting detail: THERAPIES SERIES
End: 2019-08-21
Attending: INTERNAL MEDICINE
Payer: COMMERCIAL

## 2019-08-21 PROCEDURE — 92507 TX SP LANG VOICE COMM INDIV: CPT | Mod: GN | Performed by: SPEECH-LANGUAGE PATHOLOGIST

## 2019-08-22 ENCOUNTER — HOSPITAL ENCOUNTER (OUTPATIENT)
Dept: SPEECH THERAPY | Facility: CLINIC | Age: 39
Setting detail: THERAPIES SERIES
End: 2019-08-22
Attending: INTERNAL MEDICINE
Payer: COMMERCIAL

## 2019-08-22 PROCEDURE — 92507 TX SP LANG VOICE COMM INDIV: CPT | Mod: GN | Performed by: SPEECH-LANGUAGE PATHOLOGIST

## 2019-08-23 ENCOUNTER — HOSPITAL ENCOUNTER (OUTPATIENT)
Dept: SPEECH THERAPY | Facility: CLINIC | Age: 39
Setting detail: THERAPIES SERIES
End: 2019-08-23
Attending: INTERNAL MEDICINE
Payer: COMMERCIAL

## 2019-08-23 PROCEDURE — 92507 TX SP LANG VOICE COMM INDIV: CPT | Mod: GN | Performed by: SPEECH-LANGUAGE PATHOLOGIST

## 2019-08-27 ENCOUNTER — HOSPITAL ENCOUNTER (OUTPATIENT)
Dept: SPEECH THERAPY | Facility: CLINIC | Age: 39
Setting detail: THERAPIES SERIES
End: 2019-08-27
Attending: INTERNAL MEDICINE
Payer: COMMERCIAL

## 2019-08-27 PROCEDURE — 92507 TX SP LANG VOICE COMM INDIV: CPT | Mod: GN | Performed by: SPEECH-LANGUAGE PATHOLOGIST

## 2019-08-28 ENCOUNTER — HOSPITAL ENCOUNTER (OUTPATIENT)
Dept: SPEECH THERAPY | Facility: CLINIC | Age: 39
Setting detail: THERAPIES SERIES
End: 2019-08-28
Attending: INTERNAL MEDICINE
Payer: COMMERCIAL

## 2019-08-28 PROCEDURE — 92507 TX SP LANG VOICE COMM INDIV: CPT | Mod: GN | Performed by: SPEECH-LANGUAGE PATHOLOGIST

## 2019-08-30 NOTE — PROGRESS NOTES
Outpatient Speech Language Pathology Discharge Note     Patient: Srinivasa Casas  : 1980    Beginning/End Dates of Reporting Period:  19 to 2019    Referring Provider: Dr. Naheed MD     Therapy Diagnosis: Severe global aphasia, moderate to severe apraxia.     Client Self Report: Pt is a 39 y.o. Male who completed an OP SLP evaluation on 2019 following L MCA CVA and STEMI. Pt remains highly motivated and completes all recommended HEP with family support.     Objective Measurements: See below:              Goals:  Goal Identifier STG 3S-Lufhdnwf-Gezlus Expression (V/E)   Goal Description Pt will complete a basic to moderate level word finding task (generative naming, synonyms, opposites, categoriest) provided 0-min cues and 80% accuracy in order to request desired items verbally.    Target Date 19   Date Met      Progress: Goal not met. Pt demonstrated 50% I for moderate level tasks, and improves to 100% provided mod cues.      Goal Identifier STG 0-Vzdunbqc-Edqkkcp Expression (W/E)   Goal Description Pt will complete a basic written expression task (name, address, ROCIO, JESSICA) provided max cues and 80% accuracy in order to sign his name independently.    Target Date 19   Date Met      Progress: Goal not met. Pt demonstrates 40% I for 4-6 letter words, and improves to 100% provided min to mod phonemic cues and reps. Pt benefits from mod to max increases in processing time.      Goal Identifier STG 3-Language-Auditory Comprehension (A/C)   Goal Description Pt will complete a basic auditory comprehension task (i.e. 1 step directions) provided max cues and 80% accuracy in order to understand directions for task completion.    Target Date 19   Date Met      Progress: Goal not met. Pt demonstrates 33% I for 1 step directions, improves to 80% provided mod to max cues, reps, and models.      Goal Identifier STG 6U-Ffubssne-Yhnimmg Comprehension (R/C)   Goal Description Pt will  complete a basic level R/C task (i.e. read a direction) provided 0-min cues and 80% accuracy in order to follow written directions/steps to a task independently.   Target Date 09/17/19   Date Met      Progress: Goal not met. Pt <50% accurate for 1 step directions using functional objects. Pt improves to 550-65% with max cues, and 100% with auditory cue.      Goal Identifier STG 5A-Speech   Goal Description Pt will demonstrate 80% accuracy for CVC words and rote phrases (2-4 syllables) provided mod to max cues, reps, and models in order to verbally communicate basic wants and needs.    Target Date 09/17/19   Date Met      Progress: Goal not met. Pt demonstrating 20% I for initial consonant blend words and improves to 100% provided direct cues, reps, and models.      Progress Toward Goals:    Progress this reporting period: Pt demonstrating slow progress towards all goals. Has demonstrated improved auditory comprehension with context cues. Recommend; Pt transfer to the [facility].     Plan:  Discharge from therapy.    Discharge: Yes     Reason for Discharge:Pt transferring to the [facility]asia Program pending insurance approval.     Discharge Plan: Patient to continue home program.  Other services: [facility].    Thank you for referring Srinivasa Casas to outpatient therapy at [facility] in [place].  Please call Felicitas Coffey MA, SLP-CCC at [phone] or email [email or web address] with any questions or concerns.      Felicitas Coffey M.A., SLP-CCC  Speech-Language Pathologist

## 2019-09-10 ENCOUNTER — HOSPITAL ENCOUNTER (OUTPATIENT)
Dept: ULTRASOUND IMAGING | Facility: CLINIC | Age: 39
Discharge: HOME | End: 2019-09-10
Attending: SURGERY | Admitting: SURGERY
Payer: COMMERCIAL

## 2019-09-10 ENCOUNTER — OFFICE VISIT (OUTPATIENT)
Dept: OTHER | Facility: CLINIC | Age: 39
End: 2019-09-10
Attending: SURGERY
Payer: COMMERCIAL

## 2019-09-10 VITALS — SYSTOLIC BLOOD PRESSURE: 105 MMHG | DIASTOLIC BLOOD PRESSURE: 71 MMHG | HEART RATE: 54 BPM

## 2019-09-10 DIAGNOSIS — I99.8 LOWER LIMB ISCHEMIA: ICD-10-CM

## 2019-09-10 DIAGNOSIS — I73.9 CLAUDICATION (H): ICD-10-CM

## 2019-09-10 DIAGNOSIS — I99.8 LOWER LIMB ISCHEMIA: Primary | ICD-10-CM

## 2019-09-10 PROCEDURE — G0463 HOSPITAL OUTPT CLINIC VISIT: HCPCS

## 2019-09-10 PROCEDURE — 93924 LWR XTR VASC STDY BILAT: CPT

## 2019-09-10 PROCEDURE — 99213 OFFICE O/P EST LOW 20 MIN: CPT | Mod: ZP | Performed by: SURGERY

## 2019-09-10 NOTE — NURSING NOTE
Srinivasa Casas is a 39 year old male who presents for:  Chief Complaint   Patient presents with     RECHECK     NATALEE w/ ex (10:00 [facility]; 10:45 [facility]) History of claudication, faciotomy and lower limb ischemia; 6 month follow up to 5/21/19 imaging. Pt sees Dr. Joe        Vitals:    Vitals:    09/10/19 1102   BP: 105/71   BP Location: Right arm   Patient Position: Chair   Cuff Size: Adult Regular   Pulse: 54       BMI:  There is no height or weight on file to calculate BMI.    Pain Score:  Data Unavailable        Taryn Pittman MA

## 2019-09-10 NOTE — PROGRESS NOTES
Vascular Surgery Clinic Note     I am seeing Srinivasa back in clinic today regarding his history of acute stroke and right leg ischemia status post right lower extremity embolectomy and 4 compartment fasciotomy.  These procedures were done in outside facility.  His history is as follows below.     Srinivasa Casas is a 39 year old male who was seen today to establish care with vascular surgery.  He was in [place] in March when he had a heart attack and reported stroke at the same time with expressive aphasia still today.  His wife says his stroke symptoms are slowly improving.  He also had thrombosis of the right EIA after cardiac cath, requiring right femoral embolectomy to restore flow to the right leg post-operatively and 4 compartment fasciotomy of the right lower leg.  He has done well since that time.  Most of the interview his wife is speaking for him because of the aphasia and he doesn't speak great english.       Today in clinic I am discussing his progress with him and his wife is present.  His expressive aphasia is improved slightly but he still has significant difficulty with expression but he can completely understand what I am saying to him.  He is denying any significant claudication and says he is able to do most activities without limitation.  He says occasionally he gets some fatigue in the right calf after prolonged use but this is not limiting.  He is denying rest pain and tissue loss at this time as well.  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  He also still has mild difficulty with motor function in the right foot specifically the toes however his strength is 5 out of 5 when evaluated.  This could be related to a combination of compartment syndrome but most likely is related to his stroke which had caused significant right-sided paraplegia  that has mostly resolved.      Physical Examination:  /71 (BP Location: Right arm, Patient Position: Chair, Cuff Size: Adult Regular)   Pulse 54   Generally: No acute distress he is alert but orientation is difficult to assess because he still has significant expressive aphasia.  He can however stand what I am saying to him.  HEENT: PERRLA, mucous memories moist.  Skin: Bilateral lower extremity skin is warm to the touch is got minimal swelling in the right foot compared to the left but it is very minimal.  There is no dependent rubor on the right.  Neurologic: His motor is 5 out of 5 with plantar and dorsiflexion on the right side.  He does have some difficulty wiggling the toes.  Otherwise his gait is appropriate.  Pulses: His pulses are not palpable in the right foot but he does have monophasic signal with hand-held Doppler.    Imaging:  PROCEDURE:   NATALEE with Doppler Waveforms, segmental pressures, and NATALEE exercise of  the bilateral lower extremities     DATE OF PROCEDURE:   9/10/2019 10:40 AM      CLINICAL HISTORY/INDICATION:  Hx of claudication, faciotomy and lower limb ischemia; Lower limb  ischemia; Claudication (H)     COMPARISON:  ABIs 5/23/2019     TECHNIQUE:  Resting and exercise ankle branchial indices and waveform analysis of  the bilateral lower extremities     FINDINGS:  The patient walked on a treadmill for 5 minutes at a 10% incline at  1.5 mph.  The patient had no symptoms with exercise.      The resting and exercise ABIs on the right are 0.67 and 0.25   respectively     The resting and exercise ABIs on the left are 1.14 and 1.21   respectively     Right:  Resting NATALEE: 0.67   Wave form: Monophasic      Left:  Resting NATALEE: 1.14   Wave form: Triphasic                                                                       IMPRESSION:  1.  Resting NATALEE on the right compatible with moderate arterial  insufficiency that significantly worsens becoming severe with  exercise. This is worsened from  prior examination.  2.  Normal resting and exercise ABIs in the left.      Assessment: 40 yo male with history of MI, left hemisphere stroke and right leg ischemia s/p embolectomy and 4 compartment fasciotomy.    Plan:    I discussed the findings of the ABIs with exercise with Srinivasa and his wife today.  At this time he is completely asymptomatic and has no significant claudication symptoms in the right leg and no rest pain or signs of critical limb ischemia.  He is currently on baby aspirin and warfarin given his stroke which they think was cardiac in origin.  He is continuing to work on his expressive a aphasia and exercises regularly.  He is noticed that his right leg is improved significantly from a walking standpoint since last time I saw him.  His NATALEE however is unchanged and there is unknown occlusion of the right tibioperoneal trunk as seen on the previous ultrasound.  My recommendation at this time is continued exercise with medical management and will monitor his symptoms.  Both the wife and the patient are happy with this plan and will see him back in 6 months with ABIs with exercise and an arterial duplex of the right leg.  I spent 15 minutes of face-to-face time, > 50% spent counseling and coordinating care.       Bethel Joe MD  Vascular Surgery

## 2019-09-12 DIAGNOSIS — I99.8 LOWER LIMB ISCHEMIA: Primary | ICD-10-CM

## 2019-10-21 NOTE — ADDENDUM NOTE
Encounter addended by: Jeannette Jones, OT on: 10/21/2019 3:15 PM   Actions taken: Clinical Note Signed, Episode resolved

## 2019-10-21 NOTE — PROGRESS NOTES
Outpatient Occupational Therapy Discharge Note     Patient: Srinivasa Casas  : 1980    Beginning/End Dates of Reporting Period:  19 to 2019    Referring Provider: Dr. Farfan    Therapy Diagnosis: Impaired ADL/IADL I and safety    Goals:     Goal Identifier 1-Strength   Goal Description Pt will demonstrate independence with UE strength HEP in order to return to baseline function for work tasks.   Target Date 10/09/19   Date Met      Progress:     Goal Identifier 2-community reintegration   Goal Description Pt will demonstrate mod I with visual scanning, attention, and problem-solving for increased safety with community reintegration.   Target Date 10/09/19   Date Met      Progress:     Goal Identifier 3-Medication management   Goal Description Pt will demonstrate ability to accurately set up 4 new moderately complex medications for increased safety and independence with medication management.   Target Date 10/09/19   Date Met      Progress:     Goal Identifier 4-Managing routine   Goal Description Pt will demonstrate mod I with following a routine (planning his day, getting ready, etc) 50% of the time for increased independence with ADL's/IADL's   Target Date 10/09/19   Date Met      Progress:     Goal Identifier 5-Work   Goal Description Pt will perform simulated work activity with 90% accuracy and min cues for return to work tasks.   Target Date 10/09/19   Date Met      Progress:     Goal Identifier 6 Meal prep   Goal Description Pt will complete simple meal prep task safely with mod I.    Target Date 10/09/19   Date Met      Progress:       Progress Toward Goals:   Not assessed this period.    Plan:  Discharge from therapy.    Discharge:    Reason for Discharge: Pt has been seen in intensive aphasia clinic and discharged from OT during that time.  Will resume OT when pt has completed the program. Goals remain unmet.    Discharge Plan: Patient to continue home program.  Other services: Intensive  aphasia clinic.

## 2020-04-23 ENCOUNTER — HOSPITAL ENCOUNTER (OUTPATIENT)
Dept: SPEECH THERAPY | Facility: CLINIC | Age: 40
Setting detail: THERAPIES SERIES
End: 2020-04-23
Attending: INTERNAL MEDICINE
Payer: COMMERCIAL

## 2020-04-23 PROCEDURE — 92523 SPEECH SOUND LANG COMPREHEN: CPT | Mod: GN | Performed by: SPEECH-LANGUAGE PATHOLOGIST

## 2020-04-23 NOTE — PROGRESS NOTES
[facility]          OUTPATIENT SPEECH LANGUAGE PATHOLOGY LANGUAGE-COGNITION  EVALUATION  PLAN OF TREATMENT FOR OUTPATIENT REHABILITATION  (COMPLETE FOR INITIAL CLAIMS ONLY)  Patient's Last Name, First Name, M.I.  YOB: 1980  Srinivasa Casas                           Provider s Name: [facility] Medical Record No.  [id number]     Onset Date:  03/24/20(Date of current MD orders. )   Start of Care Date: 04/23/20   Type:     ___PT  __OT   _X_SLP    Medical Diagnosis:  CVA   Speech Language Pathology Diagnosis:  Moderate to severe global aphasia     Visits from SOC: 1                                        ________________________________________________________________________________  Plan of Treatment/Functional Goals:   Planned Therapy Interventions: Language, Communication          Language / Cognition Goals  1. Goal Identifier: LTG 1-Speech       Goal Description: Pt will demonstrate 80% for 2-3 syllable words provided 0-min verbal/phonemic cues in order to state wants and needs independently.        Target Date: 07/22/20   2. Goal Identifier: LTG 2-Verbal Expression        Goal Description: Patient will learn, implement, and demonstrate use of 3 word-finding strategies with 80% accuracy provided with min cues to meet daily expressive language needs.  (JESSICA CHAN, picture naming, adj. description. )       Target Date: 07/22/20   3. Goal Identifier: LTG 3-Written Expression        Goal Description: Pt will spell 3-6 letter words with 80% provided 0-min cues in order to communicate via text message.        Target Date: 07/22/20   4. Goal Identifier: LTG 4-Auditory Comprehension        Goal Description: Pt will complete a basic to moderate level task (yes/no questions, short paragraph comprehension) with 80% provided 0-min cues in order to engage in conversation with family and friends.         Target Date: 07/22/20   5. Goal Identifier: LTG 5-Reading Comprehension        Goal Description: Pt will complete a basic level reading task with 80% accuracy (i.e. reading 1 step direction) provided 0-min cues in order to understand written directions.        Target Date: 07/22/20              Predicted Duration of Therapy Intervention (days/wks):  2x/week for 3 monts     Felicitas Coffey MA CCC-SLP       I CERTIFY THE NEED FOR THESE SERVICES FURNISHED UNDER        THIS PLAN OF TREATMENT AND WHILE UNDER MY CARE .             Physician Signature               Date    X_____________________________________________________                        Certification Date From:  04/23/20  Certification Date To:   07/22/20          Referring Physician:  Dr. William MD     Initial Assessment        See Epic Evaluation Start Of Care Date: 04/23/20

## 2020-04-23 NOTE — PROGRESS NOTES
Speech-Language Pathology Department   [facility]    04/23/20 0800   General Information   Type of Evaluation Speech and Language;Dysarthria   Type Of Visit Initial   Start Of Care Date 04/23/20   Referring Physician Dr. William MD    Orders Evaluate And Treat   Orders Comment Dysarthria    Medical Diagnosis CVA   Onset Of Illness/injury Or Date Of Surgery 03/24/20  (Date of current MD orders. )   Precautions/Limitations  no known precautions/limitations   Hearing WFL   Surgical/Medical history reviewed Yes   Pertinent History Of Current Problem Pt is a 40 y.o. male known to this SLP from previous POC in 2019. Pt was on a business trip 3/12-13/2019 when he suffered STEMI and L MCA CVA. Pt has engaged in 6 month therapeutic break and returns today to re-initiate therapy.    Current Community Support  Family/friend caregiver  (Pt lives with spouse and children.)   Living environment [place]   General Observations Pt alert and engaged.    Patient/family Goals Verbal communication, speech, texting.    Abuse Screen (yes response referral indicated)   Feels Unsafe at Home or Work/School no   Feels Threatened by Someone no   Does Anyone Try to Keep You From Having Contact with Others or Doing Things Outside Your Home? no   Physical Signs of Abuse Present no   Pain Assessment   Pain Reported No   Speech   Deficits in Speech Respiration None   Deficits in Phonation None   Deficits in Articulation Apraxia of speech  (Dysarthria )   Deficits in Resonance None   Apraxia Battery:  Word Repetition - single syllable (out of 10 possible) 8   Apraxia Battery:  Increasing word length (out of 10 possible) 2   Apraxia Battery:  Word Repetition - mulitple syllables (out of 10 possible) 0   Language: Auditory Comprehension (understanding of spoken language)   Tests were administered at the following levels Basic (rote activities)   One Step Commands  (out of 10 total) 4   Y/N Simple Questions (out of 10 total) 1   Functional Assessment Scale (Auditory Comprehension) Moderate to Severe Impairment   Language: Verbal Expression (use of spoken language to express information)   Tests were administered at the following levels Basic (rote activities)   Automatized Sequences; [place] Diagnostic Aphasia Exam (out of 8 total) 0   Functional Assessment Scale (Verbal Expression) Severe Impairment   Comments (Verbal Expression) Impacted by apraxia/dysarthria    Reading Comprehension (understanding of written language)   Tests were administered at the following levels Basic (rote activities)   Match Letters/Match Words (out of 8 total) 8   Simple Phrase/Sentence (out of 15 total) 8   Functional Assessment Scale (Reading Comprehension) Moderate to Severe Impairment   Written Expression (use of writing to express information)   Tests were administered at the following levels Basic (rote activities)   Mechanics of Writing; [place] Diagnostic Aphasia Exam (out of 5 total) 5  (Pt copied address from drivers license. )   Recall Written Symbols; [place] Diagnostic Aphasia Exam (out of 62 total) 28   Functional Assessment Scale (Written Expression) Moderate to Severe Impairment   Education Assessment   Barriers to Learning Language   Preferred Learning Style Listening;Reading;Demonstration;Pictures/video   General Therapy Interventions   Planned Therapy Interventions Language;Communication   Language Auditory comprehension;Reading comprehension;Verbal expression;Written expression   Communication Improve speech intelligibility   Clinical Impression, SLP Eval   Criteria for Skilled Therapeutic Interventions Met (SLP Eval) skilled criteria for speech language intervention met   SLP Diagnosis Moderate to severe global aphasia    Functional limitations due to impairments Pt is unable to independenlty communicate basic wants and needs.    Therapy Frequency 2 times;per week   Predicted  Duration of Therapy Intervention (days/wks) 3 monts    Risks and Benefits of Treatment have been explained. Yes   Patient, Family & other staff in agreement with plan of care Yes   Clinical Impression Comments Pt demonstrates moderate to severe apraxia, dysarthria and aphasia in the areas of verbal/written expression, and reading/auditory comprehension. Symptoms include: word finding difficulty, sound and word level paraphasias in verbal and written expression, difficulty with word finding and spelling in written tasks, difficulty with comprehension of longer written materials, difficulty with comprehension of longer auditory tasks, and imprecise articulation. These symptoms are impairing Pt's ability to communicate easily and effectively with others. RECOMMEND: a course of skilled speech therapy targeting verbal/written expression, auditory/reading comprehension, and speech sound instruction in order to improve language skills for daily functional communication.    Language/Cognition Goals   Language/Cognition Goals 1;2;3;4;5   Language/Cognition Goal 1   Goal Identifier LTG 1-Speech   Goal Description Pt will demonstrate 80% for 2-3 syllable words provided 0-min verbal/phonemic cues in order to state wants and needs independently.     Target Date 07/22/20   Language/Cognition Goal 2   Goal Identifier LTG 2-Verbal Expression    Goal Description Patient will learn, implement, and demonstrate use of 3 word-finding strategies with 80% accuracy provided with min cues to meet daily expressive language needs.    (JESSICA CHAN, picture naming, adj. description. )   Target Date 07/22/20   Language/Cognition Goal 3   Goal Identifier LTG 3-Written Expression    Goal Description Pt will spell 3-6 letter words with 80% provided 0-min cues in order to communicate via text message.    Target Date 07/22/20   Language/Cognition Goal 4   Goal Identifier LTG 4-Auditory Comprehension    Goal Description Pt will complete a basic to  moderate level task (yes/no questions, short paragraph comprehension) with 80% provided 0-min cues in order to engage in conversation with family and friends.    Target Date 07/22/20   Language/Cognition Goal 5   Goal Identifier LTG 5-Reading Comprehension    Goal Description Pt will complete a basic level reading task with 80% accuracy (i.e. reading 1 step direction) provided 0-min cues in order to understand written directions.    Target Date 07/22/20   Total Session Time   Sound production with lang comprehension and expression minutes (63469) 30   Total Evaluation Time 30   Therapy Certification   Certification date from 04/23/20   Certification date to 07/22/20   Medical Diagnosis CVA     Thank you for referring [facility]iv[facility] Cas[facility].  Please call Felicitas Coffey MA, SLP-CCC at [phone] or email [email or web address] with any questions or concerns.      Felicitas Coffey M.A., SLP-CCC  Speech-Language Pathologist

## 2020-04-28 ENCOUNTER — HOSPITAL ENCOUNTER (OUTPATIENT)
Dept: SPEECH THERAPY | Facility: CLINIC | Age: 40
Setting detail: THERAPIES SERIES
End: 2020-04-28
Attending: INTERNAL MEDICINE
Payer: COMMERCIAL

## 2020-04-28 PROCEDURE — 92507 TX SP LANG VOICE COMM INDIV: CPT | Mod: GN | Performed by: STUDENT IN AN ORGANIZED HEALTH CARE EDUCATION/TRAINING PROGRAM

## 2020-05-05 ENCOUNTER — HOSPITAL ENCOUNTER (OUTPATIENT)
Dept: SPEECH THERAPY | Facility: CLINIC | Age: 40
Setting detail: THERAPIES SERIES
End: 2020-05-05
Attending: INTERNAL MEDICINE
Payer: COMMERCIAL

## 2020-05-05 PROCEDURE — 92507 TX SP LANG VOICE COMM INDIV: CPT | Mod: GN | Performed by: SPEECH-LANGUAGE PATHOLOGIST

## 2020-05-05 NOTE — PROGRESS NOTES
[facility]          OUTPATIENT SPEECH LANGUAGE PATHOLOGY LANGUAGE-COGNITION  EVALUATION  PLAN OF TREATMENT FOR OUTPATIENT REHABILITATION  (COMPLETE FOR INITIAL CLAIMS ONLY)  Patient's Last Name, First Name, M.I.  YOB: 1980  Srinivasa Casas                           Provider s Name: [facility] Medical Record No.  [id number]     Onset Date:  03/24/20(Date of current MD orders. )   Start of Care Date: 04/23/20   Type:     ___PT  __OT   _X_SLP    Medical Diagnosis:  CVA   Speech Language Pathology Diagnosis:  Moderate to severe global aphasia     Visits from SOC: 1                                        ________________________________________________________________________________  Plan of Treatment/Functional Goals:   Planned Therapy Interventions: Language, Communication            Language / Cognition Goals  1. Goal Identifier: LTG 1-Speech       Goal Description: Pt will demonstrate 80% for 2-3 syllable words provided 0-min verbal/phonemic cues in order to state wants and needs independently.        Target Date: 07/22/20   2. Goal Identifier: LTG 2-Verbal Expression        Goal Description: Patient will learn, implement, and demonstrate use of 3 word-finding strategies with 80% accuracy provided with min cues to meet daily expressive language needs.  (JESSICA CHAN, picture naming, adj. description. )       Target Date: 07/22/20   3. Goal Identifier: LTG 3-Written Expression        Goal Description: Pt will spell 3-6 letter words with 80% provided 0-min cues in order to communicate via text message.        Target Date: 07/22/20   4. Goal Identifier: LTG 4-Auditory Comprehension        Goal Description: Pt will complete a basic to moderate level task (yes/no questions, short paragraph comprehension) with 80% provided 0-min cues in order to engage in conversation with family and friends.         Target Date: 07/22/20   5. Goal Identifier: LTG 5-Reading Comprehension        Goal Description: Pt will complete a basic level reading task with 80% accuracy (i.e. reading 1 step direction) provided 0-min cues in order to understand written directions.        Target Date: 07/22/20               Predicted Duration of Therapy Intervention (days/wks): 2x.week for 3 felas     Felicitas Coffey, SLP       I CERTIFY THE NEED FOR THESE SERVICES FURNISHED UNDER        THIS PLAN OF TREATMENT AND WHILE UNDER MY CARE .             Physician Signature               Date    X_____________________________________________________                        Certification Date From:  04/23/20  Certification Date To:   07/22/20          Referring Physician:  Dr. William MD     Initial Assessment        See Epic Evaluation Start Of Care Date: 04/23/20

## 2020-05-07 ENCOUNTER — HOSPITAL ENCOUNTER (OUTPATIENT)
Dept: SPEECH THERAPY | Facility: CLINIC | Age: 40
Setting detail: THERAPIES SERIES
End: 2020-05-07
Attending: INTERNAL MEDICINE
Payer: COMMERCIAL

## 2020-05-07 PROCEDURE — 92507 TX SP LANG VOICE COMM INDIV: CPT | Mod: GN | Performed by: SPEECH-LANGUAGE PATHOLOGIST

## 2020-05-14 ENCOUNTER — HOSPITAL ENCOUNTER (OUTPATIENT)
Dept: SPEECH THERAPY | Facility: CLINIC | Age: 40
Setting detail: THERAPIES SERIES
End: 2020-05-14
Attending: INTERNAL MEDICINE
Payer: COMMERCIAL

## 2020-05-14 PROCEDURE — 92507 TX SP LANG VOICE COMM INDIV: CPT | Mod: GN | Performed by: STUDENT IN AN ORGANIZED HEALTH CARE EDUCATION/TRAINING PROGRAM

## 2020-05-19 ENCOUNTER — HOSPITAL ENCOUNTER (OUTPATIENT)
Dept: SPEECH THERAPY | Facility: CLINIC | Age: 40
Setting detail: THERAPIES SERIES
End: 2020-05-19
Attending: INTERNAL MEDICINE
Payer: COMMERCIAL

## 2020-05-19 PROCEDURE — 92507 TX SP LANG VOICE COMM INDIV: CPT | Mod: GN | Performed by: SPEECH-LANGUAGE PATHOLOGIST

## 2020-05-21 ENCOUNTER — HOSPITAL ENCOUNTER (OUTPATIENT)
Dept: SPEECH THERAPY | Facility: CLINIC | Age: 40
Setting detail: THERAPIES SERIES
End: 2020-05-21
Attending: INTERNAL MEDICINE
Payer: COMMERCIAL

## 2020-05-21 ENCOUNTER — APPOINTMENT (OUTPATIENT)
Dept: CT IMAGING | Facility: CLINIC | Age: 40
End: 2020-05-21
Attending: EMERGENCY MEDICINE
Payer: COMMERCIAL

## 2020-05-21 ENCOUNTER — HOSPITAL ENCOUNTER (EMERGENCY)
Facility: CLINIC | Age: 40
Discharge: HOME | End: 2020-05-21
Attending: EMERGENCY MEDICINE | Admitting: EMERGENCY MEDICINE
Payer: COMMERCIAL

## 2020-05-21 VITALS
BODY MASS INDEX: 26.92 KG/M2 | HEART RATE: 65 BPM | OXYGEN SATURATION: 97 % | SYSTOLIC BLOOD PRESSURE: 116 MMHG | HEIGHT: 70 IN | RESPIRATION RATE: 16 BRPM | WEIGHT: 188 LBS | DIASTOLIC BLOOD PRESSURE: 78 MMHG | TEMPERATURE: 97.7 F

## 2020-05-21 DIAGNOSIS — S09.90XA CLOSED HEAD INJURY, INITIAL ENCOUNTER: ICD-10-CM

## 2020-05-21 DIAGNOSIS — S01.81XA CHIN LACERATION, INITIAL ENCOUNTER: ICD-10-CM

## 2020-05-21 PROCEDURE — 70450 CT HEAD/BRAIN W/O DYE: CPT

## 2020-05-21 PROCEDURE — 92507 TX SP LANG VOICE COMM INDIV: CPT | Mod: GN | Performed by: SPEECH-LANGUAGE PATHOLOGIST

## 2020-05-21 PROCEDURE — 99284 EMERGENCY DEPT VISIT MOD MDM: CPT | Mod: 25

## 2020-05-21 ASSESSMENT — ENCOUNTER SYMPTOMS: WOUND: 1

## 2020-05-21 ASSESSMENT — MIFFLIN-ST. JEOR: SCORE: 1764.01

## 2020-05-21 NOTE — ED PROVIDER NOTES
"  History     Chief Complaint:  Fall      HPI   Srinivasa Casas is a 40 year old male with a history of STEMI, stroke, aspiration pneumonia and on warfarin who presents with fall. Per chart review, the patient had been hospitalized after a stroke in the left hemisphere due to occulusion in the heart, with left heart catherization, LV thrombus, with complications and had fasciotomy of left thigh due to swelling, and aspiration pneumonia. The patient's wife notes that he has had a prior stroke in 4/5/2019 and that he was just at speech therapy today due to aphasia complications from stroke and had been leaving the facility and he states that he had tripped on his boots on the way out but that the nurse said that he may have blacked out or had hit his head. His wife states that they are concerned for a stroke.       Allergies:  No known drug allergies     Medications:    Aspirin 81 mg   Atorvastatin  Carvedilol  Gabapentin  Lisinopril  Sertraline  Warfarin  Nitrostat  Nicotine    Past Medical History:    Fasciotomy  Lower limb ischemia  Hyperlipidemia  Aspiration pneumonia  Stroke  STEMI  CHF  Global aphasia  Open wound of lower leg  Nicotine dependence  Late effects of cerebrovascular disease    Past Surgical History:    Thrombectomy and fasciotomy  Irrigation and debridement  Penumbra thrombectomy    Family History:    CAD, father CABG 59 death  DM, father  HLD, father    Social History:  Smoking status: Former smoker   Alcohol use: Not currently  Drug use: Never   PCP: MARIA DOLORES HINSON  Presents to the ED unaccompanied  Marital Status:   [2]    Review of Systems   Skin: Positive for wound (chin and abrasion to left cheek).   All other systems reviewed and are negative.        Physical Exam     Patient Vitals for the past 24 hrs:   BP Temp Temp src Pulse Resp SpO2 Height Weight   05/21/20 0833 116/78 97.7  F (36.5  C) Axillary 65 16 97 % 1.778 m (5' 10\") 85.3 kg (188 lb)        Physical Exam  GENERAL: well " developed, pleasant  HEAD: atraumatic  EYES: pupils reactive, extraocular muscles intact, conjunctivae normal  ENT:  mucus membranes moist  NECK:  trachea midline, normal range of motion  RESPIRATORY: no tachypnea, breath sounds clear to auscultation   CVS: normal S1/S2, no murmurs, intact distal pulses  ABDOMEN: soft, nontender, nondistention  MUSCULOSKELETAL: no deformities  SKIN: warm and dry, no acute rashes or ulceration. V shaped laceration to the chin. Abrasion to the right cheek.  NEURO: GCS 15, cranial nerves intact, alert and oriented x3. Normal finger to nose. Aphasia   PSYCH:  Mood/affect normal        Emergency Department Course     Imaging:  Radiology findings were communicated with the patient who voiced understanding of the findings.    Head CT:  Probable large chronic left MCA territory infarct.   Otherwise, normal head CT. No evidence for acute intracranial   pathology.       Radiation dose for this scan was reduced using automated exposure   control, adjustment of the mA and/or kV according to patient size, or   iterative reconstruction technique.       Reading per radiology       Procedures    Laceration Repair        LACERATION:  A simple clean 2.5 cm V shaped laceration.      LOCATION:  [place]      FUNCTION:  Distally sensation are intact.      PREPARATION:  Irrigation with Normal Saline and Shur Clens      DEBRIDEMENT:  no debridement      CLOSURE:  Wound was closed with steri strips       Emergency Department Course:   Nursing notes and vitals reviewed.    0835 I performed an exam of the patient as documented above.     0859 The patient was sent for a Head CT while in the emergency department, results above.      1004 Patient refused sutures and laceration was steri stripped, see procedure note above. I personally reviewed the results with the patient and answered all related questions prior to discharge.    Impression & Plan      Medical Decision Making:  Srinivasa Casas is a 40 year old male  who presents to the emergency department today for evaluation of fall.  Patient has had prior stroke but is fairly adamant with his communication that he had a mechanical fall and that he tripped on his feet.  He has a laceration to  his chin.  He is adamant that he does not want sutures.  It does gape and does not appear to be amenable to glue.  It was cleansed with saline and Shur-Clens..  Steri-Strips were applied by myself.  Head CT shows no acute findings.    Diagnosis:    ICD-10-CM    1. Closed head injury, initial encounter  S09.90XA    2. Chin laceration, initial encounter  S01.81XA        Disposition:   The patient is discharged to home.     Scribe Disclosure:  I, Dawna Yoon, am serving as a scribe at 0835 on 5/21/2020 to document services personally performed by Percy Wright MD based on my observations and the provider's statements to me.    EMERGENCY DEPARTMENT       Percy Wright MD  05/21/20 [id number]

## 2020-05-21 NOTE — ED TRIAGE NOTES
Fell while at clinic - did not brace himself during fall according to witness. Wife is present as patient as significant aphasia from previous stroke and cannot communicate via other methods.

## 2020-05-21 NOTE — DISCHARGE INSTRUCTIONS
Discharge Instructions  Head Injury    You have been seen today for a head injury. You were checked for serious problems, like bleeding on the brain, but these problems cannot always be found right away.  Due to this risk, you should not be alone for 24 hours after your injury.  Follow up with your regular physician in 2-3 days. If you are taking a blood thinner, such as aspirin, Pradaxa  (dabigatran), Coumadin  (warfarin), or Plavix  (clopidogrel), you are at especially high risk for immediate or delayed bleeding, and need to re-check with a physician in 24 hours, or sooner if any of the symptoms below happen.     Return to the Emergency Department if:  You are confused, have amnesia, or you are not acting right.  Your headache gets worse or you start to have a really bad headache even with your recommended treatment plan.  You vomit more than once.  You have a convulsion or seizure.  You have trouble walking.  You have weakness or paralysis in an arm or a leg.  You have blood or fluid coming from your ears or nose.  You have new symptoms or anything that worries you.    Sleeping:  It is okay for you to sleep, but someone should wake you up as instructed by your doctor, and someone should check on you at your usual time to wake up.     Activity:  Do not drive for at least 24 hours.  Do not drive if you have dizzy spells or trouble concentrating, or remembering things.  Do not return to any contact sports until cleared by your regular doctor.     Follow-up:  It is very important that you make an appointment with your clinic and go to the appointment.  If you do not follow-up with your regular doctor, it may result in missing an important development which could result in permanent injury or disability and/or lasting pain.  If there is any problem keeping your appointment, call your doctor or return to the Emergency Department.    MORE INFORMATION:    Concussion:  A concussion is a minor head injury that may cause  temporary problems with the way your brain works.  Some symptoms include:  confusion, amnesia, nausea and vomiting, dizziness, fatigue, memory or concentration problems, irritability and sleep problems.    CT Scans: Your evaluation today may have included a CT scan (CAT scan) to look for things like bleeding or a skull fracture (break).  CT scans involve radiation and too many CT scans can cause serious health problems like cancer, especially in children.  Because of this, your doctor may not have ordered a CT scan today if they think you are at low risk for a serious or life threatening problem.    If you were given a prescription for medicine here today, be sure to read all of the information (including the package insert) that comes with your prescription.  This will include important information about the medicine, its side effects, and any warnings that you need to know about.  The pharmacist who fills the prescription can provide more information and answer questions you may have about the medicine.  If you have questions or concerns that the pharmacist cannot address, please call or return to the Emergency Department.     Opioid Medication Information    Pain medications are among the most commonly prescribed medicines, so we are including this information for all our patients. If you did not receive pain medication or get a prescription for pain medicine, you can ignore it.     You may have been given a prescription for an opioid (narcotic) pain medicine and/or have received a pain medicine while here in the Emergency Department. These medicines can make you drowsy or impaired. You must not drive, operate dangerous equipment, or engage in any other dangerous activities while taking these medications. If you drive while taking these medications, you could be arrested for DUI, or driving under the influence. Do not drink any alcohol while you are taking these medications.     Opioid pain medications can cause  addiction. If you have a history of chemical dependency of any type, you are at a higher risk of becoming addicted to pain medications.  Only take these prescribed medications to treat your pain when all other options have been tried. Take it for as short a time and as few doses as possible. Store your pain pills in a secure place, as they are frequently stolen and provide a dangerous opportunity for children or visitors in your house to start abusing these powerful medications. We will not replace any lost or stolen medicine.  As soon as your pain is better, you should flush all your remaining medication.     Many prescription pain medications contain Tylenol  (acetaminophen), including Vicodin , Tylenol #3 , Norco , Lortab , and Percocet .  You should not take any extra pills of Tylenol  if you are using these prescription medications or you can get very sick.  Do not ever take more than 3000 mg of acetaminophen in any 24 hour period.    All opioids tend to cause constipation. Drink plenty of water and eat foods that have a lot of fiber, such as fruits, vegetables, prune juice, apple juice and high fiber cereal.  Take a laxative if you don t move your bowels at least every other day. Miralax , Milk of Magnesia, Colace , or Senna  can be used to keep you regular.      Remember that you can always come back to the Emergency Department if you are not able to see your regular doctor in the amount of time listed above, if you get any new symptoms, or if there is anything that worries you.

## 2020-05-21 NOTE — ED AVS SNAPSHOT
Emergency Department  [address] [place] [id number]  Phone:  [phone]  Fax:  [phone]                                    Srinivasa Casas   MRN: [id number]    Department:   Emergency Department   Date of Visit:  5/21/2020           After Visit Summary Signature Page    I have received my discharge instructions, and my questions have been answered. I have discussed any challenges I see with this plan with the nurse or doctor.    ..........................................................................................................................................  Patient/Patient Representative Signature      ..........................................................................................................................................  Patient Representative Print Name and Relationship to Patient    ..................................................               ................................................  Date                                   Time    ..........................................................................................................................................  Reviewed by Signature/Title    ...................................................              ..............................................  Date                                               Time          22EPIC Rev 08/18

## 2020-06-04 NOTE — PROGRESS NOTES
Outpatient Speech Language Pathology Discharge Note     Patient: Srinivasa Casas  : 1980    Beginning/End Dates of Reporting Period:  2020 to 2020    Referring Provider: Dr. William MD     Therapy Diagnosis: Moderate to severe global aphasia     Client Self Report: Pt is a 40  Y.o. male who completed an OP SLP evaluation on 2020, please see the initial evaluation report in Epic for further information. Pt has completed 7 total sessions including the evaluation, his spouse called to cancel all remaining apts. At this time as they are moving.     Objective Measurements: See below: data from last 2 tx. Sessions:               Goals:  Goal Identifier LTG 1-Speech   Goal Description Pt will demonstrate 80% for 2-3 syllable words provided 0-min verbal/phonemic cues in order to state wants and needs independently.    Target Date 20   Date Met      Progress: Goal not met, limited targets.      Goal Identifier LTG 2-Verbal Expression    Goal Description Patient will learn, implement, and demonstrate use of 3 word-finding strategies with 80% accuracy provided with min cues to meet daily expressive language needs.  (JESSICA CHAN, picture naming, adj. description. )   Target Date 20   Date Met      Progress: Goal not met: LTG-V/E-1 syllable words=<30% I improves to 100% provided direct verbal/phonemic cues.naming word asso=33% I imrpoves to 100% provided mod to max cues.     Goal Identifier LTG 3-Written Expression    Goal Description Pt will spell 3-6 letter words with 80% provided 0-min cues in order to communicate via text message.    Target Date 20   Date Met      Progress: Goal not met:   LTG-W/E-spelling 3-5 letter words=50% I imrpoves to 75% provided max cues.      Goal Identifier LTG 4-Auditory Comprehension    Goal Description Pt will complete a basic to moderate level task (yes/no questions, short paragraph comprehension) with 80% provided 0-min cues in order to engage in  conversation with family and friends.    Target Date 07/22/20   Date Met      Progress: Goal not met, limited targets.      Goal Identifier LTG 5-Reading Comprehension    Goal Description Pt will complete a basic level reading task with 80% accuracy (i.e. reading 1 step direction) provided 0-min cues in order to understand written directions.    Target Date 07/22/20   Date Met      Progress:  Goal not met, limited targets.          Progress Toward Goals:    Progress limited due to short course of therapy.     Plan:  Discharge from therapy.    Discharge: Yes     Reason for Discharge: Patient chooses to discontinue therapy.      Discharge Plan: Patient to continue home program.    Thank you for referring Srinivasa Casas [facility].  Please call Felicitas Coffey MA, SLP-CCC at [phone] or email [email or web address] with any questions or concerns.      Felicitas Coffey M.A., SLP-CCC  Speech-Language Pathologist

## 2020-07-02 DIAGNOSIS — Z98.890 HISTORY OF FASCIOTOMY: Primary | ICD-10-CM

## 2020-07-02 DIAGNOSIS — I99.8 LOWER LIMB ISCHEMIA: ICD-10-CM

## 2020-08-28 ENCOUNTER — HOSPITAL ENCOUNTER (OUTPATIENT)
Dept: ULTRASOUND IMAGING | Facility: CLINIC | Age: 40
End: 2020-08-28
Attending: SURGERY
Payer: COMMERCIAL

## 2020-08-28 ENCOUNTER — OFFICE VISIT (OUTPATIENT)
Dept: OTHER | Facility: CLINIC | Age: 40
End: 2020-08-28
Attending: SURGERY
Payer: COMMERCIAL

## 2020-08-28 VITALS — SYSTOLIC BLOOD PRESSURE: 93 MMHG | HEART RATE: 66 BPM | DIASTOLIC BLOOD PRESSURE: 67 MMHG

## 2020-08-28 DIAGNOSIS — I99.8 LOWER LIMB ISCHEMIA: ICD-10-CM

## 2020-08-28 DIAGNOSIS — Z98.890 HISTORY OF FASCIOTOMY: ICD-10-CM

## 2020-08-28 DIAGNOSIS — Z86.718 HISTORY OF ARTERIAL THROMBOSIS: Primary | ICD-10-CM

## 2020-08-28 PROCEDURE — 93926 LOWER EXTREMITY STUDY: CPT | Mod: RT

## 2020-08-28 PROCEDURE — 99214 OFFICE O/P EST MOD 30 MIN: CPT | Mod: ZP | Performed by: SURGERY

## 2020-08-28 PROCEDURE — G0463 HOSPITAL OUTPT CLINIC VISIT: HCPCS

## 2020-08-28 PROCEDURE — 93924 LWR XTR VASC STDY BILAT: CPT

## 2020-08-28 NOTE — PROGRESS NOTES
Vascular Surgery Clinic     Srinivasa Casas MRN# [id number]   YOB: 1980 Age: 40 year old        Reason for Clinic Visit: Longitudinal PAD care              History of Present Illness:   Srinivasa Casas is a 40 year old male who presents for longitudinal follow-up of PAD.     He has a history of (presumed cardiogenic) stroke and STEMI at age 39, approximately 03/2019, while in [place]. This resulted in expressive aphasia and right hemiparesis. He underwent cranial angiogram with thrombectomy of clot and t-PA administration; cardiac catheterization with no interventions needed; and developed iliofemoral thrombosis, ultimately requiring right femoral embolectomy and 4-compartment fasciotomy, all at the same hospitalization. He was seen for longitudinal care by my partner, Dr. Joe, one year ago. (For further details of his complex prior course, see Dr. Thomas's progress note of 4/18/20, available in Care Everywhere).     In the interim, he has been doing well. He has continued to work and has no limitation with his ambulation or activities. He can get up stairs without difficulty and does not have trouble with balance, cramping, or aching of the legs. He continues to smoke and has no interest in quitting, which his wife says is a source of constant stress for her.               Past Medical History:   I have personally reviewed the following:   CAD, hx STEMI with negative cath in 03/2019. NICM with HFrEF, LVEF 55%.   Stroke, involving the left MCA, in 03/2019. Resulted in right hemiparesis, aphasia, dysphagia. Received t-PA. Residual expressive aphasia.   Right iliofemoral thrombotic occlusion following cardiac cath in 03/2019, s/p thrombectomy   Possible thrombophilia  Depression  DL  Tobacco use          Past Surgical History:   I have personally reviewed the following:   Cranial angiogram and t-PA  Cardiac catheterization (no intervention)  Right iliofemoral thrombectomy, popliteal/tibial  thrombectomy, 4-compartment right lower leg fasciotomies         Social History:   I have personally reviewed the following:   Social History     Tobacco Use     Smoking status: Current Every Day Smoker     Smokeless tobacco: Never Used   Substance Use Topics     Alcohol use: Not Currently   Smokes 1 ppd, has smoked from age 19 onward. Does not drink and denies illicit drug use. Originally from [place]. Works as an . Lives at home with his family and 6 children.           Family History:   History reviewed. No pertinent family history.          Allergies:     Allergies   Allergen Reactions     Metoprolol Rash             Medications:     Current Outpatient Medications Ordered in Epic   Medication     aspirin (ASA) 81 MG chewable tablet     atorvastatin (LIPITOR) 80 MG tablet     carvedilol (COREG) 3.125 MG tablet     lisinopril (PRINIVIL/ZESTRIL) 2.5 MG tablet     warfarin (COUMADIN) 1 MG tablet     No current Epic-ordered facility-administered medications on file.              Review of Systems:   The 10 point Review of Systems is negative other than noted in the HPI; this was limited to what his wife was able to provide, secondary to his expressive aphasia.           Physical Exam:   Vitals were reviewed      BP: 93/67 Pulse: 66              General: sitting comfortably on exam table, NAD. Accompanied by his wife.   Neuro/Psych: A&O, mild psychomotor agitation, expressive aphasia. Able to respond to some questions with coaching from his wife.   HENT: EOMI and conjugate. Moist mucous membranes.   Cardiac: Regular rate and rhythm, no m/g appreciated.   Pulm: Lungs CTAB, no w/r/r.  Abd: Soft, non-distended, no tenderness to palpation. Thin  Extrem: Grossly normal and symmetric ROM in all four extremities. No edema. Well-healed RLE fasciotomy incisions.   Skin: Clean, dry, no rashes or wounds.  Vasc: very quiet, difficult to find monophasic R DP with biphasic R PT. Left PT palpable.           Data:   Labs  "(from MCE-5 Development)   7/14/20: Chol 124   Trig 116   HDL 34    LDL 67  3/25/20: 138 / 4.4  107 / 25  12 / 1.22 < 110    I have reviewed the following images:  Duplex Right Leg Arterial (8/28/20): \"The right external iliac artery, common femoral artery, profunda femoris artery, superficial femoral artery and popliteal artery are patent with multiphasic waveforms. There is a question of an occluded tibioperoneal trunk with large collateral arising from the distal popliteal artery helping to reconstitute the tibial arteries. Waveforms within the anterior tibial and peroneal arteries are dampened however, waveforms in the distal posterior tibial artery exhibit high resistance monophasic to biphasic Waveforms.\"    ABIs (8/28/20): R 0.91, exercise 0.46. L 1.22, exercise 1.23.     ABIs (9/10/19--one year ago): R 0.67, exercise 0.25. L 1.14, exercise 0.25.           Assessment and Plan:   Mr. Casas is a 40 year old male with history of STEMI, cardioembolic left MCA stroke, and right iliofemoral thrombosis in 03/2019. He has recovered well but has residual severe expressive aphasia. He is following with vascular surgery long term for surveillance of his legs following revascularization.        He has evidence of occlusion of the distal popliteal artery and tibial vessels on the right, with well-collateralized flow. He is completely asymptomatic currently.     He should continue on aspirin 81 mg indefinitely, along with atorvastatin (or other statin therapy).     Appreciate primary care and cardiology care.     Discussed smoking cessation and counseled him on this. From what I can gather, he has no interest in quitting currently. I emphasized the importance for his cardiovascular health.     Will plan to see him back in 1 year with repeat ABIs, or sooner if any questions or concerns arise.     Grazyna Braun MD      "

## 2021-10-08 ENCOUNTER — HOSPITAL ENCOUNTER (OUTPATIENT)
Dept: ULTRASOUND IMAGING | Facility: CLINIC | Age: 41
End: 2021-10-08
Attending: SURGERY
Payer: MEDICARE

## 2021-10-08 ENCOUNTER — OFFICE VISIT (OUTPATIENT)
Dept: OTHER | Facility: CLINIC | Age: 41
End: 2021-10-08
Attending: SURGERY
Payer: MEDICARE

## 2021-10-08 VITALS — SYSTOLIC BLOOD PRESSURE: 104 MMHG | HEART RATE: 54 BPM | DIASTOLIC BLOOD PRESSURE: 71 MMHG

## 2021-10-08 DIAGNOSIS — Z86.718 HISTORY OF ARTERIAL THROMBOSIS: ICD-10-CM

## 2021-10-08 DIAGNOSIS — Z86.718 HISTORY OF ARTERIAL THROMBOSIS: Primary | ICD-10-CM

## 2021-10-08 PROCEDURE — 99215 OFFICE O/P EST HI 40 MIN: CPT | Performed by: SURGERY

## 2021-10-08 PROCEDURE — 93924 LWR XTR VASC STDY BILAT: CPT | Mod: 26 | Performed by: SURGERY

## 2021-10-08 PROCEDURE — G0463 HOSPITAL OUTPT CLINIC VISIT: HCPCS | Mod: 25

## 2021-10-08 PROCEDURE — 93924 LWR XTR VASC STDY BILAT: CPT

## 2021-10-08 NOTE — PROGRESS NOTES
Vascular Surgery Progress Note     Date: October 8, 2021     Reason for Visit:  Longitudinal follow-up for PAD    Subjective:  Mr. Casas and his wife report no significant changes in the last year or so.   He is smoking 2 packs per day. He is unable to answer why he is still smoking. He enjoys walking and doing yardwork, but has a cigarette in his hand while doing so. He is not sure what will get him to quit smoking. He believes he might be able to cut back slightly, but essentially seems precontemplative.   He is not having any pain or symptoms of arterial insufficiency in his legs (no difficulty with walking, no limitations in walking or exercise, no wound, no pain). He does occasionally get blisters on the back of his heel from his shoes; his wife clarifies that he still seems to have sensory numbness over the foot and lower leg, leading to these blisters.       Current Outpatient Medications:      aspirin (ASA) 81 MG chewable tablet, Take 81 mg by mouth, Disp: , Rfl:      atorvastatin (LIPITOR) 80 MG tablet, Take 80 mg by mouth, Disp: , Rfl:      carvedilol (COREG) 3.125 MG tablet, Take 3.125 mg by mouth, Disp: , Rfl:      lisinopril (PRINIVIL/ZESTRIL) 2.5 MG tablet, Take 2.5 mg by mouth, Disp: , Rfl:      warfarin (COUMADIN) 1 MG tablet, Take by mouth 4 mg (1 mg x 4) every Wed; 3 mg (1 mg x 3) all other days, Disp: , Rfl:      Physical Exam       BP: 104/71 Pulse: 54            Vital Signs with Ranges  Pulse:  [54] 54  BP: (104)/(71) 104/71  0 lbs 0 oz    Constitutional: cooperative, no apparent distress, sitting comfortably in chair. Accompanied by his wife.  Vascular: dull mono-biphasic DP and PT doppler signals on the right foot; well-healing right posterior heel excoriation.   Musculoskeletal: grossly normal and symmetric ROM and strength in BL extremities   Neurologic: Awake, alert; severe expressive aphasia but seems to be doing slightly better this year with communication, although most history is  "provided by his wife and Mr. Casas gives mostly one- or two-word answers    Imaging:  I have reviewed the following imaging studies:  US ABIs (10//8/21): \"1.  Right NATALEE 0.83, indicative of moderate arterial disease with severe insufficiency elicited with exercise (0.38).  2.  Left NATALEE 1.17, with normal waveforms and normal response to exercise (1.18).  3.  Decrease in the right resting and exercise ABIs compared to exam of 2020.\"     ABIs (8/28/20): R 0.91, exercise 0.46. L 1.22, exercise 1.23.     Assessment & Plan   Mr. Casas is a 41 year old male with history of CAD and STEMI, with HFpEF;  cardioembolic left MCA stroke, with right hemiparesis, dysphagia, and expressive aphasia; depression; DL; active tobacco abuse; and right iliofemoral thrombosis in 03/2019.       Smoking cessation: We had a long discussion, and Mr. Casas's wife voiced her frustration with his continued smoking. There is significant family history of CVD secondary to smoking, in addition to his own devastating stroke. I explained that his right leg is already at a deficit from an arterial flow standpoint, and continued tobacco abuse represents a constant threat to the limb. Counseled him on modalities to help quit smoking. Explained that even nicotine replacement therapy (patches or gum) requires a firm stop date. He remains pre-contemplative.     Medical optimization: currently on aspirin 81 mg + atorvastatin 80 mg. He is on coumadin for presumed hypercoagulability with LV thrombus and embolic complications.     Will plan to have him return in 1 year for repeat ABIs, or sooner if concerns arise.       Grazyna Braun    Total time spent on the date of this encounter doing: chart review, review of test results, patient visit, physical exam, education, counseling, developing plan of care, and documenting = 40 minutes    "

## 2021-10-08 NOTE — PROGRESS NOTES
[facility]        Patient is here for a  follow up.      Pt is currently taking Aspirin, Statin and Warfarin.    /71 (BP Location: Right arm, Patient Position: Chair, Cuff Size: Adult Regular)   Pulse 54     The provider has been notified that the patient has no concerns.     Questions patient would like addressed today are: N/A.    Refills are needed: N/A    Has homecare services and agency name:  Juli Pittman MA

## 2022-02-01 NOTE — PROGRESS NOTES
[facility]    Outpatient Physical Therapy Discharge Note  Patient: Srinivasa Casas  : 1980    Beginning/End Dates of Reporting Period:  19 to 19    Referring Provider: Lewis Farfan MD    Therapy Diagnosis: Impaired functional activity tolerance secondary to right hemiparesis     Client Self Report: Doing well, having less pain; 22: Patient failed to return to PT, discharge with goals not assessed.     Goals:  Goal Identifier HEP   Goal Description Patient will demonstrate understanding and compliance to his HEP for continued wellbeing upon discharge from skilled physical therapy.   Target Date 19   Date Met      Progress (detail required for progress note): Patient failed to return to PT, discharge with goals not assessed.      Goal Identifier 6 MWT   Goal Description Patient will ambulate 1480 feet during the 6 MWT with least restrictive AD to demonstrate improved activity tolerance for independent and safe community ambulation.   Target Date 19   Date Met      Progress (detail required for progress note): Patient failed to return to PT, discharge with goals not assessed.      Goal Identifier Balance   Goal Description Patient will maintain single limb stance for 30 seconds bilaterally to demonstrate improved balance with decreased ANTONELLA for return to work without limitation.   Target Date 19   Date Met      Progress (detail required for progress note): Patient failed to return to PT, discharge with goals not assessed.      Plan:  Discharge from therapy.    Discharge:    Reason for Discharge: Patient has failed to schedule further appointments.    Equipment Issued: None    Discharge Plan: Patient to continue home program.

## 2022-02-01 NOTE — ADDENDUM NOTE
Encounter addended by: Rachael Mason, PT on: 2/1/2022 11:13 AM   Actions taken: Episode resolved, Flowsheet data copied forward, Flowsheet accepted, Clinical Note Signed

## 2022-08-20 NOTE — TELEPHONE ENCOUNTER
Received call re pt, left  noting pt was recommended to f/u with vascular specialist in [facility] s/p thrombectomy. Limited information provided on message.     Pt is new to [facility], I called back and unable to leave  due to mailbox is full.     Per [facility], pt seen at [facility],  of cerebral infarction.     Bambi Downing, DOROTHEAN, RN     Yes

## 2023-02-09 ENCOUNTER — TELEPHONE (OUTPATIENT)
Dept: OTHER | Facility: CLINIC | Age: 43
End: 2023-02-09
Payer: MEDICARE

## 2023-02-09 DIAGNOSIS — I73.9 PAD (PERIPHERAL ARTERY DISEASE) (H): Primary | ICD-10-CM

## 2023-02-09 NOTE — TELEPHONE ENCOUNTER
Old NATALEE US cancelled.  New NATALEE US ordered.    Nelli SCHMIDT, RN    [facility]  Vascular [facility]  Office: [phone]  Fax: [phone]

## 2023-02-09 NOTE — TELEPHONE ENCOUNTER
Future Appointments   Date Time Provider Department Center   3/14/2023 12:30 PM [id number]   3/14/2023  1:30 PM Grazyna Braun MD [facility]

## 2023-10-24 ENCOUNTER — OFFICE VISIT (OUTPATIENT)
Dept: OTHER | Facility: CLINIC | Age: 43
End: 2023-10-24
Attending: SURGERY
Payer: MEDICARE

## 2023-10-24 ENCOUNTER — HOSPITAL ENCOUNTER (OUTPATIENT)
Dept: ULTRASOUND IMAGING | Facility: CLINIC | Age: 43
Discharge: HOME | End: 2023-10-24
Attending: SURGERY
Payer: MEDICARE

## 2023-10-24 VITALS
HEART RATE: 70 BPM | SYSTOLIC BLOOD PRESSURE: 122 MMHG | DIASTOLIC BLOOD PRESSURE: 76 MMHG | BODY MASS INDEX: 26.22 KG/M2 | OXYGEN SATURATION: 98 % | HEIGHT: 71 IN

## 2023-10-24 DIAGNOSIS — I73.9 PAD (PERIPHERAL ARTERY DISEASE) (H): Primary | ICD-10-CM

## 2023-10-24 DIAGNOSIS — I73.9 PAD (PERIPHERAL ARTERY DISEASE) (H): ICD-10-CM

## 2023-10-24 PROCEDURE — 93924 LWR XTR VASC STDY BILAT: CPT | Mod: 26 | Performed by: SURGERY

## 2023-10-24 PROCEDURE — G0463 HOSPITAL OUTPT CLINIC VISIT: HCPCS | Mod: 25 | Performed by: SURGERY

## 2023-10-24 PROCEDURE — 99215 OFFICE O/P EST HI 40 MIN: CPT | Performed by: SURGERY

## 2023-10-24 PROCEDURE — 93924 LWR XTR VASC STDY BILAT: CPT

## 2023-10-24 ASSESSMENT — PAIN SCALES - GENERAL: PAINLEVEL: NO PAIN (0)

## 2023-10-24 NOTE — PROGRESS NOTES
Vascular Surgery Progress Note     Date: October 24, 2023     Reason for Visit:  Longitudinal follow-up of PAD    Interim History:   Srinivasa Casas has been followed by his PCP with adjustment of his medications; he had a syncopal episode (no overt pathology on ER workup in 03/23); he was seen by Psychiatry for depression. He was seen again recently by Internal Medicine for complaints of dizziness (but apparently walked out of this visit early).     Subjective:  Mr. Casas reports doing okay overall. He is really very happy with his recovery for the most part and feels like he is able to exercise and walk as much as he wants without trouble.   His wife explains that he is getting around about 1.5 miles about three times a week on a walk, in addition to regular activity at home. He has not had new pain or limitation to his ambulation.       Current Outpatient Medications:     aspirin (ASA) 81 MG chewable tablet, Take 81 mg by mouth, Disp: , Rfl:     atorvastatin (LIPITOR) 80 MG tablet, Take 80 mg by mouth, Disp: , Rfl:     carvedilol (COREG) 3.125 MG tablet, Take 3.125 mg by mouth, Disp: , Rfl:     warfarin (COUMADIN) 1 MG tablet, Take by mouth 4 mg (1 mg x 4) every Wed; 3 mg (1 mg x 3) all other days, Disp: , Rfl:     lisinopril (PRINIVIL/ZESTRIL) 2.5 MG tablet, Take 2.5 mg by mouth (Patient not taking: Reported on 10/24/2023), Disp: , Rfl:      Physical Exam       BP: 122/76 Pulse: 70     SpO2: 98 % (room air)      Vital Signs with Ranges  Pulse:  [70] 70  BP: (122)/(76) 122/76  SpO2:  [98 %] 98 %  0 lbs 0 oz    Constitutional: cooperative, no apparent distress, sitting comfortably in chair. Accompanied by his wife.  Vascular: I can feel a right PT pulse and a left DP pulse. Bi-triphasic R PT, monophasic dull R DP on my exam.  Musculoskeletal: grossly normal and symmetric ROM and strength in BL extremities   Neurologic: Awake, alert, oriented to name, place, time, and situation    Labs:   BMP ([facility], 9/25/23):  "141 / 4.5  106 / 26  13 / 1.34 < 90    CBC ([facility], 3/11/23): 12.1 > 15.8 / 47.9% < 278    Lipid Panel ([facility], 12/26/22): Chol 122  HDL 38  LDL 68  Trig 78    Imaging:  I have reviewed the following imaging studies:  US ABIs (10/24/23): \"1.  Right NATALEE 0.78; TBI 0.65; exercise NATALEE 0.45. This indicates moderate arterial insufficiency, exacerbated with exercise; the TBI indicates adequate healing potential at the digital level. The resting NATALEE has decreased compared to prior study of 10/2021 (when it was 0.83); the exercise NATALEE has modestly improved (from prior 0.38).  2.  Left NATALEE 1.11; TBI 0.94; exercise NATALEE 1.19. No significant underlying arterial disease or vasculogenic claudication indicated by this study.\"       Assessment & Plan   Mr. Casas is a 43 year old male with history of CAD and STEMI, with HFpEF;  cardioembolic left MCA stroke, with right hemiparesis, dysphagia, and expressive aphasia; depression; DL; tobacco abuse; and right iliofemoral thrombosis in 03/2019.      Smoking cessation: he was able to quit smoking (!). I congratulated him on this excellent achievement and encouraged him to stay away from cigarettes.   Continue aspirin 81 mg + atorvastatin 80 mg daily indefinitely for optimal medical management   Will plan to see him in 1 year with repeat ABIs and RLE arterial duplex, or sooner if any other concerns or questions arise in the interim.    Grazyna Braun MD    Total time spent on the date of this encounter doing: chart review, review of test results, patient visit, physical exam, education, counseling, developing plan of care, and documenting = 45 minutes    "

## 2023-10-24 NOTE — NURSING NOTE
"[facility]        Patient is here for a follow up  to discuss Hx of arterial thrombosis.     Pt is currently taking Aspirin, Statin, and Warfarin.    /76 (BP Location: Right arm, Patient Position: Sitting, Cuff Size: Adult Regular)   Pulse 70   Ht 5' 11\" (1.803 m)   SpO2 98%   BMI 26.22 kg/m      The provider has been notified that the patient has no concerns.     Questions patient would like addressed today are: N/A.    Refills are needed: No    Has homecare services and agency name:  ROXANA Abreu, RN  [facility]  Office:  [phone] Fax: [phone]             "